# Patient Record
Sex: FEMALE | Race: WHITE | Employment: OTHER | URBAN - METROPOLITAN AREA
[De-identification: names, ages, dates, MRNs, and addresses within clinical notes are randomized per-mention and may not be internally consistent; named-entity substitution may affect disease eponyms.]

---

## 2017-01-03 ENCOUNTER — HOSPITAL ENCOUNTER (OUTPATIENT)
Dept: CARDIAC REHAB | Age: 82
Discharge: HOME | End: 2017-01-03
Payer: MEDICARE

## 2017-01-03 VITALS — BODY MASS INDEX: 24.52 KG/M2 | HEIGHT: 68 IN | WEIGHT: 161.8 LBS

## 2017-01-03 PROCEDURE — 93798 PHYS/QHP OP CAR RHAB W/ECG: CPT

## 2017-01-04 ENCOUNTER — APPOINTMENT (OUTPATIENT)
Dept: CARDIAC REHAB | Age: 82
End: 2017-01-04
Payer: MEDICARE

## 2017-01-09 ENCOUNTER — HOSPITAL ENCOUNTER (OUTPATIENT)
Dept: CARDIAC REHAB | Age: 82
Discharge: HOME | End: 2017-01-09
Payer: MEDICARE

## 2017-01-09 PROCEDURE — 93798 PHYS/QHP OP CAR RHAB W/ECG: CPT

## 2017-01-11 ENCOUNTER — APPOINTMENT (OUTPATIENT)
Dept: CARDIAC REHAB | Age: 82
End: 2017-01-11
Payer: MEDICARE

## 2017-01-16 ENCOUNTER — APPOINTMENT (OUTPATIENT)
Dept: CARDIAC REHAB | Age: 82
End: 2017-01-16
Payer: MEDICARE

## 2017-01-18 ENCOUNTER — HOSPITAL ENCOUNTER (OUTPATIENT)
Dept: CARDIAC REHAB | Age: 82
Discharge: HOME | End: 2017-01-18
Payer: MEDICARE

## 2017-01-18 VITALS — WEIGHT: 157.8 LBS | BODY MASS INDEX: 23.99 KG/M2

## 2017-01-18 PROCEDURE — 93798 PHYS/QHP OP CAR RHAB W/ECG: CPT

## 2017-01-23 ENCOUNTER — APPOINTMENT (OUTPATIENT)
Dept: CARDIAC REHAB | Age: 82
End: 2017-01-23
Payer: MEDICARE

## 2017-01-25 ENCOUNTER — HOSPITAL ENCOUNTER (OUTPATIENT)
Dept: CARDIAC REHAB | Age: 82
Discharge: HOME | End: 2017-01-25
Payer: MEDICARE

## 2017-01-25 VITALS — BODY MASS INDEX: 24.08 KG/M2 | WEIGHT: 158.4 LBS

## 2017-01-25 PROCEDURE — 93798 PHYS/QHP OP CAR RHAB W/ECG: CPT

## 2017-01-27 ENCOUNTER — HOSPITAL ENCOUNTER (OUTPATIENT)
Dept: CARDIAC REHAB | Age: 82
Discharge: HOME | End: 2017-01-27
Payer: MEDICARE

## 2017-01-27 PROCEDURE — 93798 PHYS/QHP OP CAR RHAB W/ECG: CPT

## 2017-01-30 ENCOUNTER — APPOINTMENT (OUTPATIENT)
Dept: CARDIAC REHAB | Age: 82
End: 2017-01-30
Payer: MEDICARE

## 2017-02-01 ENCOUNTER — APPOINTMENT (OUTPATIENT)
Dept: CARDIAC REHAB | Age: 82
End: 2017-02-01
Payer: MEDICARE

## 2017-02-06 ENCOUNTER — HOSPITAL ENCOUNTER (OUTPATIENT)
Dept: CARDIAC REHAB | Age: 82
Discharge: HOME | End: 2017-02-06
Payer: MEDICARE

## 2017-02-06 PROCEDURE — 93798 PHYS/QHP OP CAR RHAB W/ECG: CPT

## 2017-02-08 ENCOUNTER — HOSPITAL ENCOUNTER (OUTPATIENT)
Dept: CARDIAC REHAB | Age: 82
Discharge: HOME | End: 2017-02-08
Payer: MEDICARE

## 2017-02-08 VITALS — BODY MASS INDEX: 23.93 KG/M2 | WEIGHT: 157.4 LBS

## 2017-02-08 PROCEDURE — 93798 PHYS/QHP OP CAR RHAB W/ECG: CPT

## 2017-02-13 ENCOUNTER — HOSPITAL ENCOUNTER (OUTPATIENT)
Dept: CARDIAC REHAB | Age: 82
Discharge: HOME | End: 2017-02-13
Payer: MEDICARE

## 2017-02-13 PROCEDURE — 93798 PHYS/QHP OP CAR RHAB W/ECG: CPT

## 2017-02-15 ENCOUNTER — HOSPITAL ENCOUNTER (OUTPATIENT)
Dept: CARDIAC REHAB | Age: 82
Discharge: HOME | End: 2017-02-15
Payer: MEDICARE

## 2017-02-15 VITALS — WEIGHT: 158 LBS | BODY MASS INDEX: 24.02 KG/M2

## 2017-02-15 PROCEDURE — 93798 PHYS/QHP OP CAR RHAB W/ECG: CPT

## 2017-02-22 ENCOUNTER — HOSPITAL ENCOUNTER (OUTPATIENT)
Dept: CARDIAC REHAB | Age: 82
Discharge: HOME | End: 2017-02-22
Payer: MEDICARE

## 2017-02-22 VITALS — WEIGHT: 158.4 LBS | BODY MASS INDEX: 24.08 KG/M2

## 2017-02-22 PROCEDURE — 93798 PHYS/QHP OP CAR RHAB W/ECG: CPT

## 2017-02-24 ENCOUNTER — HOSPITAL ENCOUNTER (OUTPATIENT)
Dept: CARDIAC REHAB | Age: 82
Discharge: HOME | End: 2017-02-24
Payer: MEDICARE

## 2017-02-24 PROCEDURE — 93798 PHYS/QHP OP CAR RHAB W/ECG: CPT

## 2017-02-27 ENCOUNTER — APPOINTMENT (OUTPATIENT)
Dept: CARDIAC REHAB | Age: 82
End: 2017-02-27
Payer: MEDICARE

## 2017-03-01 ENCOUNTER — HOSPITAL ENCOUNTER (OUTPATIENT)
Dept: CARDIAC REHAB | Age: 82
End: 2017-03-01
Payer: MEDICARE

## 2017-03-03 ENCOUNTER — HOSPITAL ENCOUNTER (OUTPATIENT)
Dept: CARDIAC REHAB | Age: 82
Discharge: HOME | End: 2017-03-03
Payer: MEDICARE

## 2017-03-03 VITALS — WEIGHT: 158.8 LBS | BODY MASS INDEX: 24.15 KG/M2

## 2017-03-03 PROCEDURE — 93798 PHYS/QHP OP CAR RHAB W/ECG: CPT

## 2017-03-06 ENCOUNTER — APPOINTMENT (OUTPATIENT)
Dept: CARDIAC REHAB | Age: 82
End: 2017-03-06
Payer: MEDICARE

## 2017-03-06 ENCOUNTER — HOSPITAL ENCOUNTER (OUTPATIENT)
Dept: LAB | Age: 82
Discharge: HOME | End: 2017-03-06
Payer: MEDICARE

## 2017-03-06 DIAGNOSIS — R35.0 URINE FREQUENCY: ICD-10-CM

## 2017-03-06 LAB
APPEARANCE UR: CLEAR
BACTERIA URNS QL MICRO: ABNORMAL /HPF
BILIRUB UR QL: NEGATIVE
CASTS URNS QL MICRO: ABNORMAL /LPF
COLOR UR: YELLOW
CRYSTALS URNS QL MICRO: 0 /LPF
EPI CELLS #/AREA URNS HPF: ABNORMAL /HPF
GLUCOSE UR STRIP.AUTO-MCNC: NEGATIVE MG/DL
HGB UR QL STRIP: NEGATIVE
KETONES UR QL STRIP.AUTO: ABNORMAL MG/DL
LEUKOCYTE ESTERASE UR QL STRIP.AUTO: NEGATIVE
MUCOUS THREADS URNS QL MICRO: ABNORMAL /LPF
NITRITE UR QL STRIP.AUTO: NEGATIVE
PH UR STRIP: 5.5 [PH] (ref 5–9)
PROT UR STRIP-MCNC: 30 MG/DL
RBC #/AREA URNS HPF: ABNORMAL /HPF
SP GR UR REFRACTOMETRY: 1.02 (ref 1–1.02)
UROBILINOGEN UR QL STRIP.AUTO: 0.2 EU/DL (ref 0.2–1)
WBC URNS QL MICRO: ABNORMAL /HPF

## 2017-03-06 PROCEDURE — 81015 MICROSCOPIC EXAM OF URINE: CPT | Performed by: INTERNAL MEDICINE

## 2017-03-06 PROCEDURE — 81003 URINALYSIS AUTO W/O SCOPE: CPT | Performed by: INTERNAL MEDICINE

## 2017-03-08 ENCOUNTER — HOSPITAL ENCOUNTER (OUTPATIENT)
Dept: CARDIAC REHAB | Age: 82
Discharge: HOME | End: 2017-03-08
Payer: MEDICARE

## 2017-03-08 VITALS — WEIGHT: 157.4 LBS | BODY MASS INDEX: 23.93 KG/M2

## 2017-03-08 PROCEDURE — 93798 PHYS/QHP OP CAR RHAB W/ECG: CPT

## 2017-03-10 ENCOUNTER — HOSPITAL ENCOUNTER (OUTPATIENT)
Dept: CARDIAC REHAB | Age: 82
Discharge: HOME | End: 2017-03-10
Payer: MEDICARE

## 2017-03-10 PROCEDURE — 93798 PHYS/QHP OP CAR RHAB W/ECG: CPT

## 2017-03-13 ENCOUNTER — HOSPITAL ENCOUNTER (OUTPATIENT)
Dept: CARDIAC REHAB | Age: 82
End: 2017-03-13
Payer: MEDICARE

## 2017-03-13 ENCOUNTER — HOSPITAL ENCOUNTER (OUTPATIENT)
Dept: LAB | Age: 82
Discharge: HOME | End: 2017-03-13
Payer: MEDICARE

## 2017-03-13 DIAGNOSIS — I44.7 LEFT BUNDLE BRANCH BLOCK: ICD-10-CM

## 2017-03-13 DIAGNOSIS — I50.22 CHRONIC SYSTOLIC CHF (CONGESTIVE HEART FAILURE) (HCC): ICD-10-CM

## 2017-03-13 DIAGNOSIS — I49.3 PVC'S (PREMATURE VENTRICULAR CONTRACTIONS): ICD-10-CM

## 2017-03-13 LAB
ALBUMIN SERPL BCP-MCNC: 3.6 G/DL (ref 3.2–4.6)
ALBUMIN/GLOB SERPL: 1 {RATIO}
ALP SERPL-CCNC: 56 U/L (ref 50–136)
ALT SERPL-CCNC: 19 U/L (ref 12–65)
ANION GAP BLD CALC-SCNC: 8 MMOL/L
AST SERPL W P-5'-P-CCNC: 13 U/L (ref 15–37)
BASOPHILS # BLD AUTO: 0 K/UL (ref 0–0.2)
BASOPHILS # BLD: 0 % (ref 0–2)
BILIRUB SERPL-MCNC: 0.4 MG/DL (ref 0.2–1.1)
BUN SERPL-MCNC: 14 MG/DL (ref 8–23)
CALCIUM SERPL-MCNC: 9.5 MG/DL (ref 8.3–10.4)
CHLORIDE SERPL-SCNC: 106 MMOL/L (ref 98–107)
CO2 SERPL-SCNC: 26 MMOL/L (ref 23–32)
CREAT SERPL-MCNC: 1.2 MG/DL (ref 0.6–1)
DIFFERENTIAL METHOD BLD: ABNORMAL
EOSINOPHIL # BLD: 0.2 K/UL (ref 0–0.8)
EOSINOPHIL NFR BLD: 2 % (ref 0.5–7.8)
ERYTHROCYTE [DISTWIDTH] IN BLOOD BY AUTOMATED COUNT: 14.1 % (ref 11.9–14.6)
GLOBULIN SER CALC-MCNC: 3.7 G/DL
GLUCOSE SERPL-MCNC: 113 MG/DL (ref 65–100)
HCT VFR BLD AUTO: 43.4 % (ref 35.8–46.3)
HGB BLD-MCNC: 14 G/DL (ref 11.7–15.4)
LYMPHOCYTES # BLD AUTO: 17 % (ref 13–44)
LYMPHOCYTES # BLD: 1.7 K/UL (ref 0.5–4.6)
MAGNESIUM SERPL-MCNC: 2.2 MG/DL (ref 1.8–2.4)
MCH RBC QN AUTO: 30.4 PG (ref 26.1–32.9)
MCHC RBC AUTO-ENTMCNC: 32.3 G/DL (ref 31.4–35)
MCV RBC AUTO: 94.1 FL (ref 79.6–97.8)
MONOCYTES # BLD: 1 K/UL (ref 0.1–1.3)
MONOCYTES NFR BLD AUTO: 10 % (ref 4–12)
NEUTS SEG # BLD: 7.1 K/UL (ref 1.7–8.2)
NEUTS SEG NFR BLD AUTO: 71 % (ref 43–78)
PLATELET # BLD AUTO: 246 K/UL (ref 150–450)
PMV BLD AUTO: 9.8 FL (ref 10.8–14.1)
POTASSIUM SERPL-SCNC: 5.2 MMOL/L (ref 3.5–5.1)
PROT SERPL-MCNC: 7.3 G/DL (ref 6.3–8.2)
RBC # BLD AUTO: 4.61 M/UL (ref 4.05–5.25)
SODIUM SERPL-SCNC: 140 MMOL/L (ref 136–145)
WBC # BLD AUTO: 10.1 K/UL (ref 4.3–11.1)

## 2017-03-13 PROCEDURE — 85025 COMPLETE CBC W/AUTO DIFF WBC: CPT | Performed by: INTERNAL MEDICINE

## 2017-03-13 PROCEDURE — 36415 COLL VENOUS BLD VENIPUNCTURE: CPT | Performed by: INTERNAL MEDICINE

## 2017-03-13 PROCEDURE — 80053 COMPREHEN METABOLIC PANEL: CPT | Performed by: INTERNAL MEDICINE

## 2017-03-13 PROCEDURE — 83735 ASSAY OF MAGNESIUM: CPT | Performed by: INTERNAL MEDICINE

## 2017-03-15 ENCOUNTER — APPOINTMENT (OUTPATIENT)
Dept: CARDIAC REHAB | Age: 82
End: 2017-03-15
Payer: MEDICARE

## 2017-03-16 ENCOUNTER — ANESTHESIA EVENT (OUTPATIENT)
Dept: SURGERY | Age: 82
End: 2017-03-16
Payer: MEDICARE

## 2017-03-16 NOTE — PROGRESS NOTES
Patient pre-assessment complete for BiV ICD with DR Mary Allison scheduled for 3/17/17 at 1:00, arrival time 11am. Patient verified using . Patient instructed to bring all home medications in labeled bottles on the day of procedure. NPO status reinforced. Patient instructed to HOLD losartan & spironolactone. Instructed they can take all other medications excluding vitamins & supplements. Patient verbalizes understanding of all instructions & denies any questions at this time.

## 2017-03-17 ENCOUNTER — ANESTHESIA (OUTPATIENT)
Dept: SURGERY | Age: 82
End: 2017-03-17
Payer: MEDICARE

## 2017-03-17 ENCOUNTER — HOSPITAL ENCOUNTER (OUTPATIENT)
Age: 82
Setting detail: OBSERVATION
Discharge: HOME | End: 2017-03-18
Attending: INTERNAL MEDICINE | Admitting: INTERNAL MEDICINE
Payer: MEDICARE

## 2017-03-17 ENCOUNTER — APPOINTMENT (OUTPATIENT)
Dept: CARDIAC CATH/INVASIVE PROCEDURES | Age: 82
End: 2017-03-17
Payer: MEDICARE

## 2017-03-17 DIAGNOSIS — I50.22 CHRONIC SYSTOLIC CHF (CONGESTIVE HEART FAILURE) (HCC): Primary | ICD-10-CM

## 2017-03-17 PROBLEM — Z95.810 BIVENTRICULAR ICD (IMPLANTABLE CARDIOVERTER-DEFIBRILLATOR) IN PLACE: Status: ACTIVE | Noted: 2017-03-17

## 2017-03-17 LAB
ANION GAP BLD CALC-SCNC: 13 MMOL/L (ref 7–16)
ATRIAL RATE: 72 BPM
ATRIAL RATE: 81 BPM
BUN SERPL-MCNC: 14 MG/DL (ref 8–23)
CALCIUM SERPL-MCNC: 9.6 MG/DL (ref 8.3–10.4)
CALCULATED P AXIS, ECG09: 27 DEGREES
CALCULATED P AXIS, ECG09: 69 DEGREES
CALCULATED R AXIS, ECG10: -3 DEGREES
CALCULATED R AXIS, ECG10: -83 DEGREES
CALCULATED T AXIS, ECG11: 157 DEGREES
CALCULATED T AXIS, ECG11: 51 DEGREES
CHLORIDE SERPL-SCNC: 110 MMOL/L (ref 98–107)
CO2 SERPL-SCNC: 21 MMOL/L (ref 21–32)
CREAT SERPL-MCNC: 1.25 MG/DL (ref 0.6–1)
DIAGNOSIS, 93000: NORMAL
DIAGNOSIS, 93000: NORMAL
DIASTOLIC BP, ECG02: NORMAL MMHG
DIASTOLIC BP, ECG02: NORMAL MMHG
ERYTHROCYTE [DISTWIDTH] IN BLOOD BY AUTOMATED COUNT: 14.2 % (ref 11.9–14.6)
GLUCOSE SERPL-MCNC: 116 MG/DL (ref 65–100)
HCT VFR BLD AUTO: 43.8 % (ref 35.8–46.3)
HGB BLD-MCNC: 14.8 G/DL (ref 11.7–15.4)
INR PPP: 1 (ref 0.9–1.2)
MAGNESIUM SERPL-MCNC: 2 MG/DL (ref 1.8–2.4)
MCH RBC QN AUTO: 31.4 PG (ref 26.1–32.9)
MCHC RBC AUTO-ENTMCNC: 33.8 G/DL (ref 31.4–35)
MCV RBC AUTO: 92.8 FL (ref 79.6–97.8)
P-R INTERVAL, ECG05: 110 MS
P-R INTERVAL, ECG05: 168 MS
PLATELET # BLD AUTO: 270 K/UL (ref 150–450)
PMV BLD AUTO: 11.1 FL (ref 10.8–14.1)
POTASSIUM SERPL-SCNC: 4.6 MMOL/L (ref 3.5–5.1)
PROTHROMBIN TIME: 10.4 SEC (ref 9.6–12)
Q-T INTERVAL, ECG07: 454 MS
Q-T INTERVAL, ECG07: 462 MS
QRS DURATION, ECG06: 128 MS
QRS DURATION, ECG06: 140 MS
QTC CALCULATION (BEZET), ECG08: 497 MS
QTC CALCULATION (BEZET), ECG08: 536 MS
RBC # BLD AUTO: 4.72 M/UL (ref 4.05–5.25)
SODIUM SERPL-SCNC: 144 MMOL/L (ref 136–145)
SYSTOLIC BP, ECG01: NORMAL MMHG
SYSTOLIC BP, ECG01: NORMAL MMHG
VENTRICULAR RATE, ECG03: 72 BPM
VENTRICULAR RATE, ECG03: 81 BPM
WBC # BLD AUTO: 11.8 K/UL (ref 4.3–11.1)

## 2017-03-17 PROCEDURE — 74011250636 HC RX REV CODE- 250/636

## 2017-03-17 PROCEDURE — 93005 ELECTROCARDIOGRAM TRACING: CPT | Performed by: INTERNAL MEDICINE

## 2017-03-17 PROCEDURE — C1751 CATH, INF, PER/CENT/MIDLINE: HCPCS

## 2017-03-17 PROCEDURE — C1892 INTRO/SHEATH,FIXED,PEEL-AWAY: HCPCS

## 2017-03-17 PROCEDURE — 74011250636 HC RX REV CODE- 250/636: Performed by: INTERNAL MEDICINE

## 2017-03-17 PROCEDURE — 74011250637 HC RX REV CODE- 250/637: Performed by: INTERNAL MEDICINE

## 2017-03-17 PROCEDURE — C1898 LEAD, PMKR, OTHER THAN TRANS: HCPCS

## 2017-03-17 PROCEDURE — 85610 PROTHROMBIN TIME: CPT | Performed by: INTERNAL MEDICINE

## 2017-03-17 PROCEDURE — 77030031139 HC SUT VCRL2 J&J -A

## 2017-03-17 PROCEDURE — C1882 AICD, OTHER THAN SING/DUAL: HCPCS

## 2017-03-17 PROCEDURE — 33249 INSJ/RPLCMT DEFIB W/LEAD(S): CPT

## 2017-03-17 PROCEDURE — 74011636320 HC RX REV CODE- 636/320: Performed by: INTERNAL MEDICINE

## 2017-03-17 PROCEDURE — 83735 ASSAY OF MAGNESIUM: CPT | Performed by: INTERNAL MEDICINE

## 2017-03-17 PROCEDURE — 77030008467 HC STPLR SKN COVD -B

## 2017-03-17 PROCEDURE — 76060000034 HC ANESTHESIA 1.5 TO 2 HR: Performed by: INTERNAL MEDICINE

## 2017-03-17 PROCEDURE — C1769 GUIDE WIRE: HCPCS

## 2017-03-17 PROCEDURE — 33225 L VENTRIC PACING LEAD ADD-ON: CPT

## 2017-03-17 PROCEDURE — C1896 LEAD, AICD, NON SING/DUAL: HCPCS

## 2017-03-17 PROCEDURE — 74011000250 HC RX REV CODE- 250: Performed by: INTERNAL MEDICINE

## 2017-03-17 PROCEDURE — L3670 SO ACRO/CLAV CAN WEB PRE OTS: HCPCS

## 2017-03-17 PROCEDURE — C1900 LEAD, CORONARY VENOUS: HCPCS

## 2017-03-17 PROCEDURE — 74011000272 HC RX REV CODE- 272: Performed by: INTERNAL MEDICINE

## 2017-03-17 PROCEDURE — 99218 HC RM OBSERVATION: CPT

## 2017-03-17 PROCEDURE — 77030018579 HC SUT TICRN1 COVD -B

## 2017-03-17 PROCEDURE — 80048 BASIC METABOLIC PNL TOTAL CA: CPT | Performed by: INTERNAL MEDICINE

## 2017-03-17 PROCEDURE — 93641 EP EVL 1/2CHMB PAC CVDFB TST: CPT

## 2017-03-17 PROCEDURE — 77030012935 HC DRSG AQUACEL BMS -B

## 2017-03-17 PROCEDURE — 85027 COMPLETE CBC AUTOMATED: CPT | Performed by: INTERNAL MEDICINE

## 2017-03-17 RX ORDER — NALOXONE HYDROCHLORIDE 0.4 MG/ML
0.1 INJECTION, SOLUTION INTRAMUSCULAR; INTRAVENOUS; SUBCUTANEOUS
Status: DISCONTINUED | OUTPATIENT
Start: 2017-03-17 | End: 2017-03-18 | Stop reason: HOSPADM

## 2017-03-17 RX ORDER — ZOLPIDEM TARTRATE 5 MG/1
5 TABLET ORAL
Status: DISCONTINUED | OUTPATIENT
Start: 2017-03-17 | End: 2017-03-18 | Stop reason: HOSPADM

## 2017-03-17 RX ORDER — AMIODARONE HYDROCHLORIDE 200 MG/1
200 TABLET ORAL 2 TIMES DAILY
Status: DISCONTINUED | OUTPATIENT
Start: 2017-03-17 | End: 2017-03-18 | Stop reason: HOSPADM

## 2017-03-17 RX ORDER — SODIUM CHLORIDE, SODIUM LACTATE, POTASSIUM CHLORIDE, CALCIUM CHLORIDE 600; 310; 30; 20 MG/100ML; MG/100ML; MG/100ML; MG/100ML
100 INJECTION, SOLUTION INTRAVENOUS CONTINUOUS
Status: DISCONTINUED | OUTPATIENT
Start: 2017-03-17 | End: 2017-03-18 | Stop reason: HOSPADM

## 2017-03-17 RX ORDER — SODIUM CHLORIDE 0.9 % (FLUSH) 0.9 %
5-10 SYRINGE (ML) INJECTION EVERY 8 HOURS
Status: DISCONTINUED | OUTPATIENT
Start: 2017-03-17 | End: 2017-03-18 | Stop reason: HOSPADM

## 2017-03-17 RX ORDER — PROPOFOL 10 MG/ML
INJECTION, EMULSION INTRAVENOUS AS NEEDED
Status: DISCONTINUED | OUTPATIENT
Start: 2017-03-17 | End: 2017-03-17 | Stop reason: HOSPADM

## 2017-03-17 RX ORDER — PROPOFOL 10 MG/ML
INJECTION, EMULSION INTRAVENOUS
Status: DISCONTINUED | OUTPATIENT
Start: 2017-03-17 | End: 2017-03-17 | Stop reason: HOSPADM

## 2017-03-17 RX ORDER — ONDANSETRON 2 MG/ML
4 INJECTION INTRAMUSCULAR; INTRAVENOUS
Status: DISCONTINUED | OUTPATIENT
Start: 2017-03-17 | End: 2017-03-18 | Stop reason: HOSPADM

## 2017-03-17 RX ORDER — ALPRAZOLAM 0.25 MG/1
0.25 TABLET ORAL
Status: DISCONTINUED | OUTPATIENT
Start: 2017-03-17 | End: 2017-03-18 | Stop reason: HOSPADM

## 2017-03-17 RX ORDER — SODIUM CHLORIDE 0.9 % (FLUSH) 0.9 %
5-10 SYRINGE (ML) INJECTION AS NEEDED
Status: DISCONTINUED | OUTPATIENT
Start: 2017-03-17 | End: 2017-03-18 | Stop reason: HOSPADM

## 2017-03-17 RX ORDER — GUAIFENESIN 100 MG/5ML
81 LIQUID (ML) ORAL DAILY
Status: DISCONTINUED | OUTPATIENT
Start: 2017-03-18 | End: 2017-03-18 | Stop reason: HOSPADM

## 2017-03-17 RX ORDER — CEFAZOLIN SODIUM IN 0.9 % NACL 2 G/50 ML
2 INTRAVENOUS SOLUTION, PIGGYBACK (ML) INTRAVENOUS EVERY 8 HOURS
Status: DISCONTINUED | OUTPATIENT
Start: 2017-03-17 | End: 2017-03-17 | Stop reason: SDUPTHER

## 2017-03-17 RX ORDER — DULOXETIN HYDROCHLORIDE 60 MG/1
60 CAPSULE, DELAYED RELEASE ORAL DAILY
Status: DISCONTINUED | OUTPATIENT
Start: 2017-03-18 | End: 2017-03-18 | Stop reason: HOSPADM

## 2017-03-17 RX ORDER — LIDOCAINE HYDROCHLORIDE 10 MG/ML
0.1 INJECTION INFILTRATION; PERINEURAL AS NEEDED
Status: DISCONTINUED | OUTPATIENT
Start: 2017-03-17 | End: 2017-03-18 | Stop reason: HOSPADM

## 2017-03-17 RX ORDER — BUPIVACAINE HYDROCHLORIDE AND EPINEPHRINE 5; 5 MG/ML; UG/ML
60 INJECTION, SOLUTION EPIDURAL; INTRACAUDAL; PERINEURAL ONCE
Status: COMPLETED | OUTPATIENT
Start: 2017-03-17 | End: 2017-03-17

## 2017-03-17 RX ORDER — LOSARTAN POTASSIUM 25 MG/1
25 TABLET ORAL DAILY
Status: DISCONTINUED | OUTPATIENT
Start: 2017-03-18 | End: 2017-03-18 | Stop reason: HOSPADM

## 2017-03-17 RX ORDER — SODIUM CHLORIDE, SODIUM LACTATE, POTASSIUM CHLORIDE, CALCIUM CHLORIDE 600; 310; 30; 20 MG/100ML; MG/100ML; MG/100ML; MG/100ML
75 INJECTION, SOLUTION INTRAVENOUS CONTINUOUS
Status: DISCONTINUED | OUTPATIENT
Start: 2017-03-17 | End: 2017-03-17

## 2017-03-17 RX ORDER — ACETAMINOPHEN 325 MG/1
650 TABLET ORAL
Status: DISCONTINUED | OUTPATIENT
Start: 2017-03-17 | End: 2017-03-18 | Stop reason: HOSPADM

## 2017-03-17 RX ORDER — HYDROMORPHONE HYDROCHLORIDE 1 MG/ML
0.5 INJECTION, SOLUTION INTRAMUSCULAR; INTRAVENOUS; SUBCUTANEOUS
Status: DISCONTINUED | OUTPATIENT
Start: 2017-03-17 | End: 2017-03-18 | Stop reason: HOSPADM

## 2017-03-17 RX ORDER — AMLODIPINE BESYLATE 10 MG/1
10 TABLET ORAL
Status: DISCONTINUED | OUTPATIENT
Start: 2017-03-18 | End: 2017-03-18 | Stop reason: HOSPADM

## 2017-03-17 RX ORDER — SPIRONOLACTONE 25 MG/1
25 TABLET ORAL DAILY
Status: DISCONTINUED | OUTPATIENT
Start: 2017-03-18 | End: 2017-03-18 | Stop reason: HOSPADM

## 2017-03-17 RX ORDER — FLUMAZENIL 0.1 MG/ML
0.2 INJECTION INTRAVENOUS
Status: DISCONTINUED | OUTPATIENT
Start: 2017-03-17 | End: 2017-03-18 | Stop reason: HOSPADM

## 2017-03-17 RX ORDER — CEFAZOLIN SODIUM IN 0.9 % NACL 2 G/50 ML
2 INTRAVENOUS SOLUTION, PIGGYBACK (ML) INTRAVENOUS EVERY 8 HOURS
Status: COMPLETED | OUTPATIENT
Start: 2017-03-17 | End: 2017-03-18

## 2017-03-17 RX ORDER — CARVEDILOL 25 MG/1
25 TABLET ORAL 2 TIMES DAILY WITH MEALS
Status: DISCONTINUED | OUTPATIENT
Start: 2017-03-17 | End: 2017-03-18 | Stop reason: HOSPADM

## 2017-03-17 RX ORDER — AMIODARONE HYDROCHLORIDE 150 MG/3ML
INJECTION, SOLUTION INTRAVENOUS AS NEEDED
Status: DISCONTINUED | OUTPATIENT
Start: 2017-03-17 | End: 2017-03-17 | Stop reason: HOSPADM

## 2017-03-17 RX ADMIN — ZOLPIDEM TARTRATE 5 MG: 5 TABLET ORAL at 22:58

## 2017-03-17 RX ADMIN — ACETAMINOPHEN 650 MG: 325 TABLET ORAL at 16:20

## 2017-03-17 RX ADMIN — NEOMYCIN AND POLYMYXIN B SULFATES: 40; 200000 IRRIGANT IRRIGATION at 12:00

## 2017-03-17 RX ADMIN — PROPOFOL 50 MCG/KG/MIN: 10 INJECTION, EMULSION INTRAVENOUS at 13:32

## 2017-03-17 RX ADMIN — IOVERSOL 5 ML: 678 INJECTION INTRA-ARTERIAL; INTRAVENOUS at 12:00

## 2017-03-17 RX ADMIN — BUPIVACAINE HYDROCHLORIDE AND EPINEPHRINE 300 MG: 5; 5 INJECTION, SOLUTION EPIDURAL; INTRACAUDAL; PERINEURAL at 12:00

## 2017-03-17 RX ADMIN — AMIODARONE HYDROCHLORIDE 200 MG: 200 TABLET ORAL at 17:38

## 2017-03-17 RX ADMIN — TICAGRELOR 90 MG: 90 TABLET ORAL at 17:39

## 2017-03-17 RX ADMIN — ACETAMINOPHEN 650 MG: 325 TABLET ORAL at 20:28

## 2017-03-17 RX ADMIN — Medication 10 ML: at 22:58

## 2017-03-17 RX ADMIN — Medication 10 ML: at 17:42

## 2017-03-17 RX ADMIN — SODIUM CHLORIDE, SODIUM LACTATE, POTASSIUM CHLORIDE, AND CALCIUM CHLORIDE: 600; 310; 30; 20 INJECTION, SOLUTION INTRAVENOUS at 13:04

## 2017-03-17 RX ADMIN — CARVEDILOL 25 MG: 25 TABLET, FILM COATED ORAL at 17:38

## 2017-03-17 RX ADMIN — PROPOFOL 50 MG: 10 INJECTION, EMULSION INTRAVENOUS at 13:32

## 2017-03-17 RX ADMIN — CEFAZOLIN 2 G: 1 INJECTION, POWDER, FOR SOLUTION INTRAMUSCULAR; INTRAVENOUS; PARENTERAL at 19:01

## 2017-03-17 RX ADMIN — AMIODARONE HYDROCHLORIDE 150 MG: 150 INJECTION, SOLUTION INTRAVENOUS at 14:53

## 2017-03-17 RX ADMIN — PROPOFOL 20 MG: 10 INJECTION, EMULSION INTRAVENOUS at 13:45

## 2017-03-17 NOTE — PROGRESS NOTES
Report given to Waldemar Swan, RN  Ancef infusing  L subclavian incision without bleeding/hematoma.  Pea size sanguinous drainage to aquacel assessed together

## 2017-03-17 NOTE — PROGRESS NOTES
This note will not be viewable in [facility]    Patient here for BIV ICD Implant. She was previously consented for the QP ExCELs study. The study's design, purpose, risks/benefits, alternatives and costs were reviewed again with the patient and she was reminded that study participation is completely voluntary. Pt. States she wants to continue with study participation. She denies any questions/concerns at this time. The patient denies any AE/SAEs, hospitalizations, medical procedures or medication changes since the last study contact.

## 2017-03-17 NOTE — ANESTHESIA POSTPROCEDURE EVALUATION
Post-Anesthesia Evaluation and Assessment    Patient: Villa Clemons MRN: [id number]  SSN: [id number]    YOB: 1935  Age: 80 y.o. Sex: female       Cardiovascular Function/Vital Signs  Visit Vitals    /72    Pulse 77    Temp 36.8 °C (98.2 °F)    Resp (!) 6    Ht 5' 8\" (1.727 m)    Wt 71.7 kg (158 lb)    SpO2 97%    Breastfeeding No    BMI 24.02 kg/m2       Patient is status post total IV anesthesia anesthesia for Procedure(s):  BIV ICD. Nausea/Vomiting: None    Postoperative hydration reviewed and adequate. Pain:  Pain Scale 1: Numeric (0 - 10) (03/17/17 1531)  Pain Intensity 1: 0 (03/17/17 1531)   Managed    Neurological Status:   Neuro (WDL): Within Defined Limits (03/17/17 1531)   At baseline    Mental Status and Level of Consciousness: Arousable    Pulmonary Status:   O2 Device: Nasal cannula (03/17/17 1509)   Adequate oxygenation and airway patent    Complications related to anesthesia: None    Post-anesthesia assessment completed.  No concerns    Signed By: Bismark Curtis MD     March 17, 2017

## 2017-03-17 NOTE — PROCEDURES
PRE-ELECTROPHYSIOLOGY STUDY DIAGNOSES  1. Chronic systolic heart failure, ejection fraction of 30%  2. Ischemic dilated cardiomyopathy. 3. Class II heart failure symptoms. 4. Chronic systolic heart failure for greater than 1 year's duration. 5. Primary prevention indications for defibrillator  6. No hospitalizations for congestive heart failure. 7. Life expectancy greater than 1 year. 8. Left bundle branch block with QRS duration >130 milliseconds. 9. On Guideline directed medical therapy maximum dose for >3 months prior to implant. 10. Patient has indications for permanent pacing secondary to resynchronized biventricular therapy   11. Patient has bradycardia pacing indications secondary to pharmacological rates control. 12. Frequent PVCs      PROCEDURE PERFORMED  1. Insertion of Biotronik biventricular implantable cardioverter defibrillator. 2. Testing of implantable cardioverter defibrillator. 3. Insertion of left ventricular lead at time of new system Implantation. Anesthesia: MAC     Estimated Blood Loss: Less than 10 mL     Specimens: * No specimens in log *     PROCEDURE IN DETAIL: The patient was brought into the EP lab in a fasting  state. The left shoulder was prepped and draped in the usual sterile  fashion. Skin anesthetized with lidocaine with epinephrine. Incision was  made in the region of the deltopectoral groove. Pocket was made for the  biventricular ICD. Access was obtained in the left subclavian vein via  the Seldinger technique x3 over which 3 separate guidewires were placed. Over the first guidewire, an 8-Tristanian sheath was placed. Through this  8-Tristanian sheath, a Biotronik ICD lead, model Plexa ProMRI SD 65/18 was placed  at the RV apex, we achieved the following values: R waves were 13.3mV,  threshold was 0.6 V at 0.4 msec pulse width. This lead was then secured  to the pectoral fascia with 0 Ti-Cron x2.  Over the next guidewire, a  9-Tristanian sheath was placed through which a coronary sinus mapping  catheter and sheath was placed in the coronary sinus. Venogram was  performed that showed a suitable posterolateral branch through which a  Biotronik LV lead, model Sentus OTW QP, 75 cm was placed, we achieved  the following values: Threshold was 1.3 V at 0.4 msec pulse width. This  lead was then secured to the pectoral fascia with 0 Ti-Cron x2. Over the  third guidewire, a 7-Serbian sheath was placed through which a Biotronik  right atrial lead, model  Solia, 46 cm, was placed in the  right atrial appendage. We achieved the following values: P waves were  2.9mV, threshold was 0.8 V at 0.4 msec pulse width. This lead was then  secured to the pectoral fascia with 0 Ti-Cron x2. Pocket was irrigated  with triple antibiotic flush. The leads were connected to a Biotronik  biventricular ICD, model Iperia 7 HF-T QP, serial [id number]. The leads  and biventricular ICD were then placed in the pocket area. Defibrillator  threshold testing was performed and the patient was placed into  ventricular fibrillation, was converted out with 25 joules of energy. Pocket was then closed with 2-0 Vicryl, followed by a next layer of 3-0  Vicryl, followed by a superficial layer of staples. The wound was  dressed. The patient was recovered from his sedation, transferred from  the lab in a stable condition. IMPRESSION: Successful implantation of Biotronik biventricular  implantable cardioverter defibrillator for primary prevention of sudden death.

## 2017-03-17 NOTE — PROGRESS NOTES
Patient received to [address] [place] [address] room # 4  Ambulatory from [facility]. Patient scheduled for Biv/ICD today with Dr Margarette Alvarado. Procedure reviewed & questions answered, voiced good understanding consent obtained & placed on chart. All medications and medical history reviewed. Will prep patient per orders. Patient & family updated on plan of care.

## 2017-03-17 NOTE — IP AVS SNAPSHOT
Current Discharge Medication List  
  
START taking these medications Dose & Instructions Dispensing Information Comments Morning Noon Evening Bedtime  
 amiodarone 200 mg tablet Commonly known as:  CORDARONE Your last dose was: Your next dose is:    
   
   
 Dose:  200 mg Take 1 Tab by mouth two (2) times a day. Quantity:  60 Tab Refills:  0  
     
   
   
   
  
 cephALEXin 500 mg capsule Commonly known as:  Yessy Nipper Your last dose was: Your next dose is:    
   
   
 Dose:  500 mg Take 1 Cap by mouth three (3) times daily for 8 doses. Quantity:  8 Cap Refills:  0 CONTINUE these medications which have NOT CHANGED Dose & Instructions Dispensing Information Comments Morning Noon Evening Bedtime ADULT MULTI PLUS OMEGA-3 PO Your last dose was: Your next dose is: Take  by mouth. 1 per day Refills:  0 AMBIEN 10 mg tablet Generic drug:  zolpidem Your last dose was: Your next dose is: Take  by mouth nightly. Refills:  0  
     
   
   
   
  
 aspirin 81 mg chewable tablet Your last dose was: Your next dose is:    
   
   
 Dose:  81 mg Take 1 Tab by mouth daily. Refills:  0  
     
   
   
   
  
 carvedilol 25 mg tablet Commonly known as:  Sherif Lawman Your last dose was: Your next dose is:    
   
   
 Dose:  25 mg Take 1 Tab by mouth two (2) times daily (with meals). Indications: LEFT VENTRICULAR DYSFUNCTION FOLLOWING MI  
 Quantity:  60 Tab Refills:  11  
     
   
   
   
  
 CHOLESTYRAMINE LIGHT 4 gram powder Generic drug:  Cholestyramine-Aspartame Your last dose was: Your next dose is:    
   
   
 Dose:  8 g Take 8 g by mouth every morning. Refills:  0 DULoxetine 60 mg capsule Commonly known as:  CYMBALTA Your last dose was: Your next dose is:    
   
   
 Dose:  60 mg Take 60 mg by mouth every morning. Refills:  0  
     
   
   
   
  
 losartan 25 mg tablet Commonly known as:  COZAAR Your last dose was: Your next dose is:    
   
   
 Dose:  25 mg Take 1 Tab by mouth every morning. Quantity:  90 Tab Refills:  3  
     
   
   
   
  
 nitroglycerin 0.4 mg SL tablet Commonly known as:  NITROSTAT Your last dose was: Your next dose is:    
   
   
 Dose:  0.4 mg  
1 Tab by SubLINGual route every five (5) minutes as needed for Chest Pain. Quantity:  1 Bottle Refills:  6 NORVASC 10 mg tablet Generic drug:  amLODIPine Your last dose was: Your next dose is:    
   
   
 Dose:  10 mg Take 10 mg by mouth every morning. Refills:  0  
     
   
   
   
  
 OCUVITE WITH LUTEIN PO Your last dose was: Your next dose is: Take  by mouth. 25mg-5mg daily Refills:  0  
     
   
   
   
  
 RESTASIS 0.05 % ophthalmic emulsion Generic drug:  cycloSPORINE Your last dose was: Your next dose is:    
   
   
 Dose:  1 Drop Administer 1 Drop to both eyes two (2) times a day. Refills:  0  
     
   
   
   
  
 spironolactone 25 mg tablet Commonly known as:  ALDACTONE Your last dose was: Your next dose is:    
   
   
 Dose:  25 mg Take 1 Tab by mouth every morning. Quantity:  90 Tab Refills:  3  
     
   
   
   
  
 ticagrelor 90 mg tablet Commonly known as:  [employer] Your last dose was: Your next dose is:    
   
   
 Dose:  90 mg Take 1 Tab by mouth every twelve (12) hours every twelve (12) hours. Quantity:  180 Tab Refills:  5 XANAX 0.25 mg tablet Generic drug:  ALPRAZolam  
   
Your last dose was: Your next dose is:    
   
   
 Dose:  0.25 mg Take 0.25 mg by mouth two (2) times daily as needed. Refills:  0 Where to Get Your Medications These medications were sent to [id number] [place] [address] Phone:  [phone]  
  amiodarone 200 mg tablet  
 cephALEXin 500 mg capsule

## 2017-03-17 NOTE — IP AVS SNAPSHOT
[address] [address] 
[phone] Patient: Villa Clemons MRN: [id number] LFX:6/03/[birth year]17 You are allergic to the following Allergen Reactions Latex Rash Lactose Diarrhea Adhesive Rash Atorvastatin Other (comments) Muscle aches Augmentin (Amoxicillin-Pot Clavulanate) Other (comments) GI Upset Codeine Hives Ezetimibe Other (comments) Muscle aches Fluoxetine Cough Percocet (Oxycodone-Acetaminophen) Hives Potassium Nausea Only Pravastatin Myalgia Prednisone Nausea and Vomiting Simvastatin Unknown (comments) Wellbutrin (Bupropion) Unknown (comments) Recent Documentation Height Weight Breastfeeding? BMI OB Status Smoking Status 1.74 m 70.4 kg No 23.27 kg/m2 Postmenopausal Never Smoker Emergency Contacts Name Discharge Info Relation Home Work Mobile Mallory Forman DISCHARGE CAREGIVER [3] Sister [23] [phone] About your hospitalization You were admitted on:  March 17, 2017 You last received care in the:  [facility] 3 CLINICAL OBSERVATION You were discharged on:  March 18, 2017 Unit phone number:  [phone] Why you were hospitalized Your primary diagnosis was:  Chronic Systolic Chf (Congestive Heart Failure) (Hcc) Your diagnoses also included:  Left Bundle Branch Block, Biventricular Icd (Implantable Cardioverter-Defibrillator) In Place Providers Seen During Your Hospitalizations Provider Role Specialty Primary office phone Remy Holloway MD Attending Provider Cardiology [phone] Your Primary Care Physician (PCP) Primary Care Physician Office Phone Office Fax [address] [phone] Follow-up Information Follow up With Details Comments Contact Info Jabier Khoury MD  March 28 at 11:00[facility] office w device check  [address] [facility] [address] [phone] Alban Douglass MD  As needed [address] [facility] [address] 
[phone] Your Appointments Monday March 20, 2017 11:00 AM EDT  
CARDIAC REHAB with Pinky Gutierrez [facility] CARDIOPULM REHAB ([facility]) [address]  
[phone] Wednesday March 22, 2017 11:00 AM EDT  
CARDIAC REHAB with Pinky Gutierrez [facility] CARDIOPULM REHAB ([facility]) [address]  
[phone] Monday March 27, 2017 11:00 AM EDT  
CARDIAC REHAB with Pinky Gutierrez [facility] CARDIOPULM REHAB ([facility]) [address] [place] [address]  
[phone] Tuesday March 28, 2017 11:00 AM EDT  
OFFICE DEVICE CHECKS with GVLLE DEVICE 39 [facility] Card[facility] ([facility]) [address] 
[address] [place] [facility]  
[phone] Wednesday March 29, 2017 11:00 AM EDT  
CARDIAC REHAB with Pinky Gutierrez [facility] CARDIOPULM REHAB ([facility]) [address]  
[phone] Monday April 03, 2017 11:00 AM EDT  
CARDIAC REHAB with Pinky Gutierrez [facility] CARDIOPULM REHAB ([facility]) [address]  
[phone] Wednesday April 05, 2017 11:00 AM EDT  
CARDIAC REHAB with Pinky Gutierrez [facility] CARDIOPULM REHAB ([facility]) [address]  
[phone] Monday April 10, 2017 11:00 AM EDT  
CARDIAC REHAB with Pinky Gutierrez [facility] CARDIOPULM REHAB ([facility]) [address]  
[phone] Wednesday April 12, 2017 11:00 AM EDT  
CARDIAC REHAB with Jihan PETERSON CARDIOPULM REHAB ([facility]) 1100 Annie Knight [facility]  
[phone] Monday April 17, 2017 11:00 AM EDT  
CARDIAC REHAB with Angela Ortiz [facility] CARDIOPULM REHAB ([facility]) [address] [facility]  
[phone] Wednesday April 19, 2017 11:00 AM EDT  
CARDIAC REHAB with Angela Ortiz [facility] CARDIOPULM REHAB ([facility]) [address] Annie Knight [facility]  
[phone] Monday April 24, 2017 11:00 AM EDT  
CARDIAC REHAB with Jerralsnehal Ortiz [facility] CARDIOPULM REHAB ([facility]) 1100 [facility] Laisha Knight [facility]  
[phone] Wednesday April 26, 2017 11:00 AM EDT  
CARDIAC REHAB with Mauriralsnehal Ortiz [facility] CARDIOPULM REHAB ([facility]) 1100 [facility] Laisha Knight [facility]  
[phone] Monday May 01, 2017 11:00 AM EDT  
CARDIAC REHAB with Angela Ortiz [facility] CARDIOPULM REHAB ([facility]) 1100 Annie Interiano [address] [place] [address]  
[phone] Current Discharge Medication List  
  
START taking these medications Dose & Instructions Dispensing Information Comments Morning Noon Evening Bedtime  
 amiodarone 200 mg tablet Commonly known as:  CORDARONE Your last dose was: Your next dose is:    
   
   
 Dose:  200 mg Take 1 Tab by mouth two (2) times a day. Quantity:  60 Tab Refills:  0  
     
   
   
   
  
 cephALEXin 500 mg capsule Commonly known as:  Emily Ekaterina Your last dose was: Your next dose is:    
   
   
 Dose:  500 mg Take 1 Cap by mouth three (3) times daily for 8 doses. Quantity:  8 Cap Refills:  0 CONTINUE these medications which have NOT CHANGED Dose & Instructions Dispensing Information Comments Morning Noon Evening Bedtime ADULT MULTI PLUS OMEGA-3 PO Your last dose was: Your next dose is: Take  by mouth. 1 per day Refills:  0 AMBIEN 10 mg tablet Generic drug:  zolpidem Your last dose was: Your next dose is: Take  by mouth nightly. Refills:  0  
     
   
   
   
  
 aspirin 81 mg chewable tablet Your last dose was: Your next dose is:    
   
   
 Dose:  81 mg Take 1 Tab by mouth daily. Refills:  0  
     
   
   
   
  
 carvedilol 25 mg tablet Commonly known as:  Macel Rajiv Your last dose was: Your next dose is:    
   
   
 Dose:  25 mg Take 1 Tab by mouth two (2) times daily (with meals). Indications: LEFT VENTRICULAR DYSFUNCTION FOLLOWING MI  
 Quantity:  60 Tab Refills:  11  
     
   
   
   
  
 CHOLESTYRAMINE LIGHT 4 gram powder Generic drug:  Cholestyramine-Aspartame Your last dose was: Your next dose is:    
   
   
 Dose:  8 g Take 8 g by mouth every morning. Refills:  0 DULoxetine 60 mg capsule Commonly known as:  CYMBALTA Your last dose was: Your next dose is:    
   
   
 Dose:  60 mg Take 60 mg by mouth every morning. Refills:  0  
     
   
   
   
  
 losartan 25 mg tablet Commonly known as:  COZAAR Your last dose was: Your next dose is:    
   
   
 Dose:  25 mg Take 1 Tab by mouth every morning. Quantity:  90 Tab Refills:  3  
     
   
   
   
  
 nitroglycerin 0.4 mg SL tablet Commonly known as:  NITROSTAT Your last dose was: Your next dose is:    
   
   
 Dose:  0.4 mg  
1 Tab by SubLINGual route every five (5) minutes as needed for Chest Pain. Quantity:  1 Bottle Refills:  6 NORVASC 10 mg tablet Generic drug:  amLODIPine Your last dose was: Your next dose is:    
   
   
 Dose:  10 mg Take 10 mg by mouth every morning. Refills:  0  
     
   
   
   
  
 OCUVITE WITH LUTEIN PO Your last dose was: Your next dose is: Take  by mouth. 25mg-5mg daily Refills:  0  
     
   
   
   
  
 RESTASIS 0.05 % ophthalmic emulsion Generic drug:  cycloSPORINE Your last dose was: Your next dose is:    
   
   
 Dose:  1 Drop Administer 1 Drop to both eyes two (2) times a day. Refills:  0  
     
   
   
   
  
 spironolactone 25 mg tablet Commonly known as:  ALDACTONE Your last dose was: Your next dose is:    
   
   
 Dose:  25 mg Take 1 Tab by mouth every morning. Quantity:  90 Tab Refills:  3  
     
   
   
   
  
 ticagrelor 90 mg tablet Commonly known as:  [employer] Your last dose was: Your next dose is:    
   
   
 Dose:  90 mg Take 1 Tab by mouth every twelve (12) hours every twelve (12) hours. Quantity:  180 Tab Refills:  5 XANAX 0.25 mg tablet Generic drug:  ALPRAZolam  
   
Your last dose was: Your next dose is:    
   
   
 Dose:  0.25 mg Take 0.25 mg by mouth two (2) times daily as needed. Refills:  0 Where to Get Your Medications These medications were sent to [facility]/pharmacy #[id number] Mehdi [address] [id number] Phone:  [phone]  
  amiodarone 200 mg tablet  
 cephALEXin 500 mg capsule Discharge Instructions Pacemaker Placement for Heart Failure: What to Expect at Home Your Recovery A pacemaker for heart failure is a biventricular pacemaker (say \"by-maged-TRICK-rhonda-robbie\"). Treatment that uses this type of pacemaker is called cardiac resynchronization therapy (CRT). When you have heart failure, the lower chambers of your heart may not pump at the same time. The pacemaker sends painless electrical signals to your heart. These signals make the chambers pump at the same time. This can help your heart pump blood better and help you feel better.  
Your pacemaker may be combined with an ICD, or implantable cardioverter-defibrillator. It can control abnormal heart rhythms. This can prevent sudden death. Your chest may be sore where the doctor made the cut (incision) and put in the pacemaker. You also may have a bruise and mild swelling. These symptoms usually get better in 1 to 2 weeks. You may feel a hard ridge along the incision. This usually gets softer in the months after surgery. You may be able to see or feel the outline of the pacemaker under your skin. You will probably be able to go back to work or your usual routine 1 to 2 weeks after surgery. Pacemaker batteries usually last 5 to 15 years. Your doctor will talk to you about how often you will need to have your pacemaker checked. You can safely use most household and office electronics such as kitchen appliances, electric power tools, and computers. You will need to stay away from things with strong magnetic and electrical fields such as an MRI machine (unless your pacemaker is safe for an MRI), welding equipment, and power generators. You can use a cell phone, but keep it at least 6 inches away from your pacemaker. Check with your doctor about what you need to stay away from, what you need to use with care, and what is okay to use. This care sheet gives you a general idea about how long it will take for you to recover. But each person recovers at a different pace. Follow the steps below to get better as quickly as possible. How can you care for yourself at home? Activity · Rest when you feel tired. · Be active. Walking is a good choice. · For 4 to 6 weeks: ¨ Avoid activities that strain your chest or upper arm muscles. This includes pushing a  or vacuum, mopping floors, swimming, or swinging a golf club or tennis racquet. ¨ Do not raise your arm (the one on the side of your body where the pacemaker is located) above your shoulder. ¨ Allow your body to heal. Don't move quickly or lift anything heavy until you are feeling better. · Many people are able to return to work within 1 to 2 weeks after surgery. · Ask your doctor when it is okay for you to have sex. Diet · You can eat your normal diet. If your stomach is upset, try bland, low-fat foods like plain rice, broiled chicken, toast, and yogurt. Medicines · Your doctor will tell you if and when you can restart your medicines. He or she will also give you instructions about taking any new medicines. · If you take aspirin or some other blood thinner, be sure to talk to your doctor. He or she will tell you if and when to start taking this medicine again. Make sure that you understand exactly what your doctor wants you to do. · Be safe with medicines. Read and follow all instructions on the label. ¨ If the doctor gave you a prescription medicine for pain, take it as prescribed. ¨ If you are not taking a prescription pain medicine, ask your doctor if you can take an over-the-counter medicine. ¨ Do not take aspirin, ibuprofen (Advil, Motrin), naproxen (Aleve), or other nonsteroidal anti-inflammatory drugs (NSAIDs) unless your doctor says it is okay. · If your doctor prescribed antibiotics, take them as directed. Do not stop taking them just because you feel better. You need to take the full course of antibiotics. Incision care · If you have strips of tape on the incision, leave the tape on for a week or until it falls off. · Keep the incision dry while it heals. Your doctor may recommend sponge baths for about 7 days, but do not get the incision wet. Your doctor will let you know when you may take showers. After a shower, pat the incision dry. · Don't use hydrogen peroxide or alcohol on the incision, which can slow healing. You may cover the area with a gauze bandage if it oozes fluid or rubs against clothing. Change the bandage every day. · Do not take a bath or get into a hot tub for the first 2 weeks, or until your doctor tells you it is okay. Other instructions · Keep a medical ID card with you at all times that says you have a pacemaker. The card should include the  and model information. · Wear medical alert jewelry stating that you have a pacemaker. You can buy this at most drugstores. · Check your pulse as directed by your doctor. · Have your pacemaker checked as often as your doctor recommends. In some cases, this may be done over the phone or the Internet. Your doctor will give you instructions about how to do this. Follow-up care is a key part of your treatment and safety. Be sure to make and go to all appointments, and call your doctor if you are having problems. It's also a good idea to know your test results and keep a list of the medicines you take. When should you call for help? Call 911 anytime you think you may need emergency care. For example, call if: 
· You passed out (lost consciousness). · You have severe trouble breathing. · You have sudden chest pain and shortness of breath, or you cough up blood. · You have symptoms of a heart attack. These may include: ¨ Chest pain or pressure, or a strange feeling in the chest. 
¨ Sweating. ¨ Shortness of breath. ¨ Nausea or vomiting. ¨ Pain, pressure, or a strange feeling in the back, neck, jaw, or upper belly or in one or both shoulders or arms. ¨ Lightheadedness or sudden weakness. ¨ A fast or irregular heartbeat. After you call 911, the  may tell you to chew 1 adult-strength or 2 to 4 low-dose aspirin. Wait for an ambulance. Do not try to drive yourself. · You have symptoms of a stroke. These may include: 
¨ Sudden numbness, tingling, weakness, or loss of movement in your face, arm, or leg, especially on only one side of your body. ¨ Sudden vision changes. ¨ Sudden trouble speaking. ¨ Sudden confusion or trouble understanding simple statements. ¨ Sudden problems with walking or balance. ¨ A sudden, severe headache that is different from past headaches. Call your doctor now or seek immediate medical care if: 
· Your heartbeat feels very fast or slow, skips beats, or flutters. · You are dizzy or lightheaded, or you feel like you may faint. · You have new or increased shortness of breath. · You have pain that does not get better after you take pain medicine. · You have loose stitches, or your incision comes open. · Bright red blood has soaked through the bandage over your incision. · You have signs of infection, such as: 
¨ Increased pain, swelling, warmth, or redness. ¨ Red streaks leading from the incision. ¨ Pus draining from the incision. ¨ A fever. · You have signs of a blood clot, such as: 
¨ Pain in your calf, back of the knee, thigh, or groin. ¨ Redness and swelling in your leg or groin. Watch closely for changes in your health, and be sure to contact your doctor if: 
· You have any problems with your pacemaker. Where can you learn more? Go to http://tammy-jesús.info/. Enter T984 in the search box to learn more about \"Pacemaker Placement for Heart Failure: What to Expect at Home. \" Current as of: August 4, 2016 Content Version: 11.1 © 8740-2408 [employer]. Care instructions adapted under license by [employer] (which disclaims liability or warranty for this information). If you have questions about a medical condition or this instruction, always ask your healthcare professional. [address] [address] any warranty or liability for your use of this information. DISCHARGE SUMMARY from Nurse The following personal items are in your possession at time of discharge: 
 
Dental Appliances: None Hearing Aids/Status: Bilateral 
Home Medications: Sent to pharmacy Jewelry: None Clothing: Shirt, Pants, Socks, Footwear PATIENT INSTRUCTIONS: 
 
After general anesthesia or intravenous sedation, for 24 hours or while taking prescription Narcotics: · Limit your activities · Do not drive and operate hazardous machinery · Do not make important personal or business decisions · Do  not drink alcoholic beverages · If you have not urinated within 8 hours after discharge, please contact your surgeon on call. Report the following to your surgeon: 
· Excessive pain, swelling, redness or odor of or around the surgical area · Temperature over 100.5 · Nausea and vomiting lasting longer than 4 hours or if unable to take medications · Any signs of decreased circulation or nerve impairment to extremity: change in color, persistent  numbness, tingling, coldness or increase pain · Any questions What to do at Home: *  Please give a list of your current medications to your Primary Care Provider. *  Please update this list whenever your medications are discontinued, doses are 
    changed, or new medications (including over-the-counter products) are added. *  Please carry medication information at all times in case of emergency situations. These are general instructions for a healthy lifestyle: No smoking/ No tobacco products/ Avoid exposure to second hand smoke Surgeon General's Warning:  Quitting smoking now greatly reduces serious risk to your health. Obesity, smoking, and sedentary lifestyle greatly increases your risk for illness A healthy diet, regular physical exercise & weight monitoring are important for maintaining a healthy lifestyle You may be retaining fluid if you have a history of heart failure or if you experience any of the following symptoms:  Weight gain of 3 pounds or more overnight or 5 pounds in a week, increased swelling in our hands or feet or shortness of breath while lying flat in bed. Please call your doctor as soon as you notice any of these symptoms; do not wait until your next office visit. Recognize signs and symptoms of STROKE: 
 
F-face looks uneven A-arms unable to move or move unevenly S-speech slurred or non-existent T-time-call 911 as soon as signs and symptoms begin-DO NOT go Back to bed or wait to see if you get better-TIME IS BRAIN. Warning Signs of HEART ATTACK Call 911 if you have these symptoms: 
? Chest discomfort. Most heart attacks involve discomfort in the center of the chest that lasts more than a few minutes, or that goes away and comes back. It can feel like uncomfortable pressure, squeezing, fullness, or pain. ? Discomfort in other areas of the upper body. Symptoms can include pain or discomfort in one or both arms, the back, neck, jaw, or stomach. ? Shortness of breath with or without chest discomfort. ? Other signs may include breaking out in a cold sweat, nausea, or lightheadedness. Don't wait more than five minutes to call [address]! Fast action can save your life. Calling 911 is almost always the fastest way to get lifesaving treatment. Emergency Medical Services staff can begin treatment when they arrive  up to an hour sooner than if someone gets to the hospital by car. The discharge information has been reviewed with the patient. The patient verbalized understanding. Discharge medications reviewed with the patient and appropriate educational materials and side effects teaching were provided. Discharge Orders None [employer] Announcement We are excited to announce that we are making your provider's discharge notes available to you in [employer]. You will see these notes when they are completed and signed by the physician that discharged you from your recent hospital stay. If you have any questions or concerns about any information you see in [employer], please call the Health Information Department where you were seen or reach out to your Primary Care Provider for more information about your plan of care. Introducing \[facility] [place] [facility]\"" & [facility] SERVICES!    
 Ivory Torres introduces [employer] patient portal. Now you can access parts of your medical record, email your doctor's office, and request medication refills online. 1. In your internet browser, go to [email or web address] 2. Click on the First Time User? Click Here link in the Sign In box. You will see the New Member Sign Up page. 3. Enter your [employer] Access Code exactly as it appears below. You will not need to use this code after youve completed the sign-up process. If you do not sign up before the expiration date, you must request a new code. · [employer] Access Code: [email or web address] Expires: 6/4/2017  3:50 PM 
 
4. Enter the last four digits of your Social Security Number (xxxx) and Date of Birth (mm/dd/yyyy) as indicated and click Submit. You will be taken to the next sign-up page. 5. Create a [employer] ID. This will be your [employer] login ID and cannot be changed, so think of one that is secure and easy to remember. 6. Create a [employer] password. You can change your password at any time. 7. Enter your Password Reset Question and Answer. This can be used at a later time if you forget your password. 8. Enter your e-mail address. You will receive e-mail notification when new information is available in [address] 9. Click Sign Up. You can now view and download portions of your medical record. 10. Click the Download Summary menu link to download a portable copy of your medical information. If you have questions, please visit the Frequently Asked Questions section of the [employer] website. Remember, [employer] is NOT to be used for urgent needs. For medical emergencies, dial 911. Now available from your iPhone and Android! General Information Please provide this summary of care documentation to your next provider. Patient Signature:  ____________________________________________________________ Date:  ____________________________________________________________  
  
Betsey Nelia  Provider Signature: ____________________________________________________________ Date:  ____________________________________________________________

## 2017-03-17 NOTE — PROGRESS NOTES
Bedside and Verbal shift change report given to self (oncoming nurse) by Marie Suero RN (offgoing nurse). Report included the following information SBAR, Kardex, MAR and Recent Results. Left subclavian site visualized with offgoing RN - no bleeding or hematoma noted.  Pea sized drainage on aquacel dressing

## 2017-03-17 NOTE — PROGRESS NOTES
Report received from [facility]. Procedural findings communicated. Intra procedural  medication administration reviewed. Progression of care discussed. Patient received into [address] [facility] 3 post procedure.      Incision site without bleeding or swelling yes    Dressing dry and intact yes    Patient instructed to limit movement to left upper extremity    Routine post procedural vital signs and site assessment initiated yes

## 2017-03-17 NOTE — ANESTHESIA PREPROCEDURE EVALUATION
Anesthetic History   No history of anesthetic complications            Review of Systems / Medical History  Patient summary reviewed and pertinent labs reviewed    Pulmonary  Within defined limits                 Neuro/Psych   Within defined limits           Cardiovascular    Hypertension: well controlled      CHF (EF 30%)    CAD, PAD, cardiac stents (in 2008 and again in 12/2016) and hyperlipidemia    Exercise tolerance: <4 METS  Comments: LBBB   GI/Hepatic/Renal  Within defined limits             Comments: S/p hiatal hernia repair and no further issues with reflux Endo/Other        Arthritis     Other Findings              Physical Exam    Airway  Mallampati: II  TM Distance: > 6 cm  Neck ROM: normal range of motion   Mouth opening: Normal     Cardiovascular  Regular rate and rhythm,  S1 and S2 normal,  no murmur, click, rub, or gallop  Rhythm: regular  Rate: normal         Dental  No notable dental hx       Pulmonary  Breath sounds clear to auscultation               Abdominal  GI exam deferred       Other Findings            Anesthetic Plan    ASA: 4  Anesthesia type: total IV anesthesia          Induction: Intravenous  Anesthetic plan and risks discussed with: Patient

## 2017-03-18 ENCOUNTER — APPOINTMENT (OUTPATIENT)
Dept: GENERAL RADIOLOGY | Age: 82
End: 2017-03-18
Attending: INTERNAL MEDICINE
Payer: MEDICARE

## 2017-03-18 ENCOUNTER — HOSPITAL ENCOUNTER (OUTPATIENT)
Dept: GENERAL RADIOLOGY | Age: 82
Setting detail: OBSERVATION
End: 2017-03-18
Attending: INTERNAL MEDICINE
Payer: MEDICARE

## 2017-03-18 VITALS
SYSTOLIC BLOOD PRESSURE: 171 MMHG | BODY MASS INDEX: 23 KG/M2 | RESPIRATION RATE: 18 BRPM | WEIGHT: 155.3 LBS | TEMPERATURE: 97.1 F | OXYGEN SATURATION: 96 % | HEART RATE: 82 BPM | DIASTOLIC BLOOD PRESSURE: 52 MMHG | HEIGHT: 69 IN

## 2017-03-18 LAB
ANION GAP BLD CALC-SCNC: 13 MMOL/L (ref 7–16)
BUN SERPL-MCNC: 16 MG/DL (ref 8–23)
CALCIUM SERPL-MCNC: 9 MG/DL (ref 8.3–10.4)
CHLORIDE SERPL-SCNC: 109 MMOL/L (ref 98–107)
CO2 SERPL-SCNC: 20 MMOL/L (ref 21–32)
CREAT SERPL-MCNC: 1.16 MG/DL (ref 0.6–1)
GLUCOSE SERPL-MCNC: 104 MG/DL (ref 65–100)
MAGNESIUM SERPL-MCNC: 1.9 MG/DL (ref 1.8–2.4)
POTASSIUM SERPL-SCNC: 4.2 MMOL/L (ref 3.5–5.1)
SODIUM SERPL-SCNC: 142 MMOL/L (ref 136–145)
T4 FREE SERPL-MCNC: 1.1 NG/DL (ref 0.78–1.46)
TSH SERPL DL<=0.005 MIU/L-ACNC: 2.93 UIU/ML (ref 0.36–3.74)

## 2017-03-18 PROCEDURE — 99218 HC RM OBSERVATION: CPT

## 2017-03-18 PROCEDURE — 36415 COLL VENOUS BLD VENIPUNCTURE: CPT | Performed by: INTERNAL MEDICINE

## 2017-03-18 PROCEDURE — 80048 BASIC METABOLIC PNL TOTAL CA: CPT | Performed by: INTERNAL MEDICINE

## 2017-03-18 PROCEDURE — 74011250637 HC RX REV CODE- 250/637: Performed by: INTERNAL MEDICINE

## 2017-03-18 PROCEDURE — 83735 ASSAY OF MAGNESIUM: CPT | Performed by: INTERNAL MEDICINE

## 2017-03-18 PROCEDURE — 71020 XR CHEST PA LAT: CPT

## 2017-03-18 PROCEDURE — 84439 ASSAY OF FREE THYROXINE: CPT | Performed by: INTERNAL MEDICINE

## 2017-03-18 PROCEDURE — 74011250636 HC RX REV CODE- 250/636: Performed by: INTERNAL MEDICINE

## 2017-03-18 PROCEDURE — 84443 ASSAY THYROID STIM HORMONE: CPT | Performed by: INTERNAL MEDICINE

## 2017-03-18 RX ORDER — CEPHALEXIN 500 MG/1
500 CAPSULE ORAL 3 TIMES DAILY
Qty: 8 CAP | Refills: 0 | Status: SHIPPED | OUTPATIENT
Start: 2017-03-18 | End: 2017-03-21

## 2017-03-18 RX ORDER — CEPHALEXIN 500 MG/1
500 CAPSULE ORAL 3 TIMES DAILY
Qty: 8 CAP | Refills: 0 | Status: SHIPPED | OUTPATIENT
Start: 2017-03-18 | End: 2017-03-18

## 2017-03-18 RX ORDER — AMIODARONE HYDROCHLORIDE 200 MG/1
200 TABLET ORAL 2 TIMES DAILY
Qty: 60 TAB | Refills: 0 | Status: SHIPPED | OUTPATIENT
Start: 2017-03-18 | End: 2017-04-13 | Stop reason: DRUGHIGH

## 2017-03-18 RX ORDER — AMIODARONE HYDROCHLORIDE 200 MG/1
200 TABLET ORAL 2 TIMES DAILY
Qty: 60 TAB | Refills: 0 | Status: SHIPPED | OUTPATIENT
Start: 2017-03-18 | End: 2017-03-18

## 2017-03-18 RX ADMIN — TICAGRELOR 90 MG: 90 TABLET ORAL at 06:07

## 2017-03-18 RX ADMIN — CARVEDILOL 25 MG: 25 TABLET, FILM COATED ORAL at 09:02

## 2017-03-18 RX ADMIN — AMIODARONE HYDROCHLORIDE 200 MG: 200 TABLET ORAL at 09:01

## 2017-03-18 RX ADMIN — AMLODIPINE BESYLATE 10 MG: 10 TABLET ORAL at 09:01

## 2017-03-18 RX ADMIN — Medication 81 MG: at 09:01

## 2017-03-18 RX ADMIN — CEFAZOLIN 2 G: 1 INJECTION, POWDER, FOR SOLUTION INTRAMUSCULAR; INTRAVENOUS; PARENTERAL at 03:36

## 2017-03-18 RX ADMIN — DULOXETIN HYDROCHLORIDE 60 MG: 60 CAPSULE, DELAYED RELEASE ORAL at 09:10

## 2017-03-18 RX ADMIN — Medication 10 ML: at 06:08

## 2017-03-18 RX ADMIN — LOSARTAN POTASSIUM 25 MG: 25 TABLET ORAL at 09:01

## 2017-03-18 RX ADMIN — SPIRONOLACTONE 25 MG: 25 TABLET, FILM COATED ORAL at 09:01

## 2017-03-18 NOTE — DISCHARGE INSTRUCTIONS
Pacemaker Placement for Heart Failure: What to Expect at [facility] pacemaker for heart failure is a biventricular pacemaker (say \"louise-maged-TRICK-deidrah-robbie\"). Treatment that uses this type of pacemaker is called cardiac resynchronization therapy (CRT). When you have heart failure, the lower chambers of your heart may not pump at the same time. The pacemaker sends painless electrical signals to your heart. These signals make the chambers pump at the same time. This can help your heart pump blood better and help you feel better. Your pacemaker may be combined with an ICD, or implantable cardioverter-defibrillator. It can control abnormal heart rhythms. This can prevent sudden death. Your chest may be sore where the doctor made the cut (incision) and put in the pacemaker. You also may have a bruise and mild swelling. These symptoms usually get better in 1 to 2 weeks. You may feel a hard ridge along the incision. This usually gets softer in the months after surgery. You may be able to see or feel the outline of the pacemaker under your skin. You will probably be able to go back to work or your usual routine 1 to 2 weeks after surgery. Pacemaker batteries usually last 5 to 15 years. Your doctor will talk to you about how often you will need to have your pacemaker checked. You can safely use most household and office electronics such as kitchen appliances, electric power tools, and computers. You will need to stay away from things with strong magnetic and electrical fields such as an MRI machine (unless your pacemaker is safe for an MRI), welding equipment, and power generators. You can use a cell phone, but keep it at least 6 inches away from your pacemaker. Check with your doctor about what you need to stay away from, what you need to use with care, and what is okay to use. This care sheet gives you a general idea about how long it will take for you to recover.  But each person recovers at a different pace. Follow the steps below to get better as quickly as possible. How can you care for yourself at home? Activity  · Rest when you feel tired. · Be active. Walking is a good choice. · For 4 to 6 weeks:  ¨ Avoid activities that strain your chest or upper arm muscles. This includes pushing a  or vacuum, mopping floors, swimming, or swinging a golf club or tennis racquet. ¨ Do not raise your arm (the one on the side of your body where the pacemaker is located) above your shoulder. ¨ Allow your body to heal. Don't move quickly or lift anything heavy until you are feeling better. · Many people are able to return to work within 1 to 2 weeks after surgery. · Ask your doctor when it is okay for you to have sex. Diet  · You can eat your normal diet. If your stomach is upset, try bland, low-fat foods like plain rice, broiled chicken, toast, and yogurt. Medicines  · Your doctor will tell you if and when you can restart your medicines. He or she will also give you instructions about taking any new medicines. · If you take aspirin or some other blood thinner, be sure to talk to your doctor. He or she will tell you if and when to start taking this medicine again. Make sure that you understand exactly what your doctor wants you to do. · Be safe with medicines. Read and follow all instructions on the label. ¨ If the doctor gave you a prescription medicine for pain, take it as prescribed. ¨ If you are not taking a prescription pain medicine, ask your doctor if you can take an over-the-counter medicine. ¨ Do not take aspirin, ibuprofen (Advil, Motrin), naproxen (Aleve), or other nonsteroidal anti-inflammatory drugs (NSAIDs) unless your doctor says it is okay. · If your doctor prescribed antibiotics, take them as directed. Do not stop taking them just because you feel better. You need to take the full course of antibiotics.   Incision care  · If you have strips of tape on the incision, leave the tape on for a week or until it falls off. · Keep the incision dry while it heals. Your doctor may recommend sponge baths for about 7 days, but do not get the incision wet. Your doctor will let you know when you may take showers. After a shower, pat the incision dry. · Don't use hydrogen peroxide or alcohol on the incision, which can slow healing. You may cover the area with a gauze bandage if it oozes fluid or rubs against clothing. Change the bandage every day. · Do not take a bath or get into a hot tub for the first 2 weeks, or until your doctor tells you it is okay. Other instructions  · Keep a medical ID card with you at all times that says you have a pacemaker. The card should include the  and model information. · Wear medical alert jewelry stating that you have a pacemaker. You can buy this at most [employer]. · Check your pulse as directed by your doctor. · Have your pacemaker checked as often as your doctor recommends. In some cases, this may be done over the phone or the Internet. Your doctor will give you instructions about how to do this. Follow-up care is a key part of your treatment and safety. Be sure to make and go to all appointments, and call your doctor if you are having problems. It's also a good idea to know your test results and keep a list of the medicines you take. When should you call for help? Call 911 anytime you think you may need emergency care. For example, call if:  · You passed out (lost consciousness). · You have severe trouble breathing. · You have sudden chest pain and shortness of breath, or you cough up blood. · You have symptoms of a heart attack. These may include:  ¨ Chest pain or pressure, or a strange feeling in the chest.  ¨ Sweating. ¨ Shortness of breath. ¨ Nausea or vomiting. ¨ Pain, pressure, or a strange feeling in the back, neck, jaw, or upper belly or in one or both shoulders or arms. ¨ Lightheadedness or sudden weakness.   ¨ A fast or irregular heartbeat. After you call 911, the  may tell you to chew 1 adult-strength or 2 to 4 low-dose aspirin. Wait for an ambulance. Do not try to drive yourself. · You have symptoms of a stroke. These may include:  ¨ Sudden numbness, tingling, weakness, or loss of movement in your face, arm, or leg, especially on only one side of your body. ¨ Sudden vision changes. ¨ Sudden trouble speaking. ¨ Sudden confusion or trouble understanding simple statements. ¨ Sudden problems with walking or balance. ¨ A sudden, severe headache that is different from past headaches. Call your doctor now or seek immediate medical care if:  · Your heartbeat feels very fast or slow, skips beats, or flutters. · You are dizzy or lightheaded, or you feel like you may faint. · You have new or increased shortness of breath. · You have pain that does not get better after you take pain medicine. · You have loose stitches, or your incision comes open. · Bright red blood has soaked through the bandage over your incision. · You have signs of infection, such as:  ¨ Increased pain, swelling, warmth, or redness. ¨ Red streaks leading from the incision. ¨ Pus draining from the incision. ¨ A fever. · You have signs of a blood clot, such as:  ¨ Pain in your calf, back of the knee, thigh, or groin. ¨ Redness and swelling in your leg or groin. Watch closely for changes in your health, and be sure to contact your doctor if:  · You have any problems with your pacemaker. Where can you learn more? Go to http://tammy-jesús.info/. Enter T984 in the search box to learn more about \"Pacemaker Placement for Heart Failure: What to Expect at Home. \"  Current as of: August 4, 2016  Content Version: 11.1  © 0823-2242 [employer]. Care instructions adapted under license by [employer] (which disclaims liability or warranty for this information).  If you have questions about a medical condition or this instruction, always ask your healthcare professional. [place] [address] any warranty or liability for your use of this information. DISCHARGE SUMMARY from Nurse    The following personal items are in your possession at time of discharge:    Dental Appliances: None     Hearing Aids/Status: Bilateral  Home Medications: Sent to pharmacy  Jewelry: None  Clothing: Shirt, Pants, Socks, Footwear                PATIENT INSTRUCTIONS:    After general anesthesia or intravenous sedation, for 24 hours or while taking prescription Narcotics:  · Limit your activities  · Do not drive and operate hazardous machinery  · Do not make important personal or business decisions  · Do  not drink alcoholic beverages  · If you have not urinated within 8 hours after discharge, please contact your surgeon on call. Report the following to your surgeon:  · Excessive pain, swelling, redness or odor of or around the surgical area  · Temperature over 100.5  · Nausea and vomiting lasting longer than 4 hours or if unable to take medications  · Any signs of decreased circulation or nerve impairment to extremity: change in color, persistent  numbness, tingling, coldness or increase pain  · Any questions        What to do at Home:    *  Please give a list of your current medications to your Primary Care Provider. *  Please update this list whenever your medications are discontinued, doses are      changed, or new medications (including over-the-counter products) are added. *  Please carry medication information at all times in case of emergency situations. These are general instructions for a healthy lifestyle:    No smoking/ No tobacco products/ Avoid exposure to second hand smoke    Surgeon General's Warning:  Quitting smoking now greatly reduces serious risk to your health.     Obesity, smoking, and sedentary lifestyle greatly increases your risk for illness    A healthy diet, regular physical exercise & weight monitoring are important for maintaining a healthy lifestyle    You may be retaining fluid if you have a history of heart failure or if you experience any of the following symptoms:  Weight gain of 3 pounds or more overnight or 5 pounds in a week, increased swelling in our hands or feet or shortness of breath while lying flat in bed. Please call your doctor as soon as you notice any of these symptoms; do not wait until your next office visit. Recognize signs and symptoms of STROKE:    F-face looks uneven    A-arms unable to move or move unevenly    S-speech slurred or non-existent    T-time-call 911 as soon as signs and symptoms begin-DO NOT go       Back to bed or wait to see if you get better-TIME IS BRAIN. Warning Signs of HEART ATTACK     Call 911 if you have these symptoms:   Chest discomfort. Most heart attacks involve discomfort in the center of the chest that lasts more than a few minutes, or that goes away and comes back. It can feel like uncomfortable pressure, squeezing, fullness, or pain.  Discomfort in other areas of the upper body. Symptoms can include pain or discomfort in one or both arms, the back, neck, jaw, or stomach.  Shortness of breath with or without chest discomfort.  Other signs may include breaking out in a cold sweat, nausea, or lightheadedness. Don't wait more than five minutes to call 911 - MINUTES MATTER! Fast action can save your life. Calling 911 is almost always the fastest way to get lifesaving treatment. Emergency Medical Services staff can begin treatment when they arrive -- up to an hour sooner than if someone gets to the hospital by car. The discharge information has been reviewed with the patient. The patient verbalized understanding. Discharge medications reviewed with the patient and appropriate educational materials and side effects teaching were provided.

## 2017-03-18 NOTE — PROGRESS NOTES
Bedside and Verbal shift change report given to PRATIK Boothe (oncoming nurse) by self (offgoing nurse). Report included the following information SBAR, Kardex, MAR and Recent Results. Left subclavian site visualized with oncoming RN. Dressing dry and intact. Some drainage on dressing. No active bleeding or hematoma noted.

## 2017-03-18 NOTE — PROGRESS NOTES
Discharge instructions were reviewed with patient. An opportunity was given for questions. All medications were reviewed, and information was given on the new medications. Patient verbalized understanding, and has no questions at this time.    Narcotics returned and paper signed by pt and placed on chart

## 2017-03-18 NOTE — DISCHARGE SUMMARY
[facility] Cardiology Discharge Summary     Patient ID:  Willy Troy  [id number]  80 y.o.  1935    Admit date: 3/17/2017    Discharge date and time:  3-18-17    Admitting Physician: Oni Benitez MD     Discharge Physician: Christian Sr NP/    Admission Diagnoses: Congestive heart failure, unspecified congestive heart failure chronicity, unspecified congestive heart failure type [facility]) [I50.9]    Discharge Diagnoses:   Patient Active Problem List    Diagnosis Date Noted    Biventricular ICD (implantable cardioverter-defibrillator) in place 03/17/2017    Chronic systolic CHF (congestive heart failure) ([facility] 03/13/2017    Cardiomyopathy ([facility] 11/28/2016    PVC's (premature ventricular contractions) 10/27/2016    Statin intolerance 10/27/2016    Hypertension 10/18/2016    CAD (coronary artery disease) 10/18/2016    Left bundle branch block 10/18/2016    Status post hip replacement 04/10/2013       Cardiology Procedures this admission:  Pacemaker Implantation, CXR  Consults: none    Hospital Course: Pt was admitted with chronic systolic heart failure needing Biv ICD for primary prevention. Pt was scheduled for an AM Admission for a Biv ICD implantation at [facility] on 3-17-17. Pt came in to [facility] and was taken to the cath lab by Dr. Tali Sanderson. Pt received a [employer] biventricular implantable cardioverter defibrillator pacemaker without complication. Follow up CXR showed no pneumothorax. Pt tolerated the procedure well and was taken to the telemetry floor for recovery. The following morning Pt was up feeling well without any complaints of CP, SOB or palpitations. Pt's left subclavian PM site was clean, dry and intact without hematoma. Pt's labs were WNL. The patient's Biv/ICD was interrogated prior to discharge with normal function. Pt was seen and examined by Dr. Jose Chambers and determined stable and ready for discharge.  Pt was instructed to follow PM discharge instructions given by nursing staff. The patient will be d/c home in amiodarone 200 mg bid until f/u appt with Dr. Juanita Simpson to consider decreasing dose. The patient will f/u with Dr. Juanita Simpson on March 28th at 11 am in [place] office for device check. DISPOSITION: The patient is being discharged home in stable condition on a low saturated fat, low cholesterol and low salt diet. Pt is instructed to advance activities as tolerated limited to fatigue or shortness of breath. Pt is instructed not to lift anything over 5-10 lbs with affected arm. No pushing or pulling or lifting arm above shoulder. See complete discharge instructions. Pt is instructed to watch PM site for bleeding/oozing, if seen Pt is instructed to apply firm pressure with clean cloth and call office at [phone]. Pt is instructed to watch for signs of infection which include increasing area of redness around site, fever/hot to touch or purulent drainage. Pt is instructed to call office or return to ER for immediate evaluation of any shortness of breath, chest pain or palpitations. Discharge Exam:   Visit Vitals    /52 (BP 1 Location: Right arm, BP Patient Position: Supine)    Pulse 82    Temp 97.1 °F (36.2 °C)    Resp 18    Ht 5' 8.5\" (1.74 m)    Wt 70.4 kg (155 lb 4.8 oz)    SpO2 96%    Breastfeeding No    BMI 23.27 kg/m2   Pt has been seen by Connie Torres: see his progress note for exam details.     Recent Results (from the past 24 hour(s))   CBC W/O DIFF    Collection Time: 03/17/17 11:47 AM   Result Value Ref Range    WBC 11.8 (H) 4.3 - 11.1 K/uL    RBC 4.72 4.05 - 5.25 M/uL    HGB 14.8 11.7 - 15.4 g/dL    HCT 43.8 35.8 - 46.3 %    MCV 92.8 79.6 - 97.8 FL    MCH 31.4 26.1 - 32.9 PG    MCHC 33.8 31.4 - 35.0 g/dL    RDW 14.2 11.9 - 14.6 %    PLATELET 858 053 - 148 K/uL    MPV 11.1 10.8 - 14.1 FL   MAGNESIUM    Collection Time: 03/17/17 11:47 AM   Result Value Ref Range    Magnesium 2.0 1.8 - 2.4 mg/dL   METABOLIC PANEL, BASIC    Collection Time: 03/17/17 11:47 AM Result Value Ref Range    Sodium 144 136 - 145 mmol/L    Potassium 4.6 3.5 - 5.1 mmol/L    Chloride 110 (H) 98 - 107 mmol/L    CO2 21 21 - 32 mmol/L    Anion gap 13 7 - 16 mmol/L    Glucose 116 (H) 65 - 100 mg/dL    BUN 14 8 - 23 MG/DL    Creatinine 1.25 (H) 0.6 - 1.0 MG/DL    GFR est AA 53 (L) >60 ml/min/1.73m2    GFR est non-AA 44 (L) >60 ml/min/1.73m2    Calcium 9.6 8.3 - 10.4 MG/DL   PROTHROMBIN TIME + INR    Collection Time: 03/17/17 11:47 AM   Result Value Ref Range    Prothrombin time 10.4 9.6 - 12.0 sec    INR 1.0 0.9 - 1.2     EKG, 12 LEAD, INITIAL    Collection Time: 03/17/17 12:01 PM   Result Value Ref Range    Systolic BP  mmHg    Diastolic BP  mmHg    Ventricular Rate 72 BPM    Atrial Rate 72 BPM    P-R Interval 168 ms    QRS Duration 140 ms    Q-T Interval 454 ms    QTC Calculation (Bezet) 497 ms    Calculated P Axis 69 degrees    Calculated R Axis -3 degrees    Calculated T Axis 157 degrees    Diagnosis       Sinus rhythm with frequent Premature ventricular complexes in a pattern of   bigeminy  Left bundle branch block  Abnormal ECG  When compared with ECG of 07-DEC-2016 06:16,  No significant change was found    Confirmed by MONTANA HOLLEY (), YO ALFARO ([id number]) on 3/17/2017 12:30:08 PM     EKG, 12 LEAD, INITIAL    Collection Time: 03/17/17  4:09 PM   Result Value Ref Range    Systolic BP  mmHg    Diastolic BP  mmHg    Ventricular Rate 81 BPM    Atrial Rate 81 BPM    P-R Interval 110 ms    QRS Duration 128 ms    Q-T Interval 462 ms    QTC Calculation (Bezet) 536 ms    Calculated P Axis 27 degrees    Calculated R Axis -83 degrees    Calculated T Axis 51 degrees    Diagnosis       AV sequential or dual chamber electronic pacemaker  When compared with ECG of 17-MAR-2017 12:01,  Electronic ventricular pacemaker has replaced Sinus rhythm  Confirmed by Seth Holley MD (), BRIDGETTE NEVILLE ([id number]) on 3/17/2017 3:21:99 PM     METABOLIC PANEL, BASIC    Collection Time: 03/18/17  4:15 AM   Result Value Ref Range    Sodium 142 136 - 145 mmol/L    Potassium 4.2 3.5 - 5.1 mmol/L    Chloride 109 (H) 98 - 107 mmol/L    CO2 20 (L) 21 - 32 mmol/L    Anion gap 13 7 - 16 mmol/L    Glucose 104 (H) 65 - 100 mg/dL    BUN 16 8 - 23 MG/DL    Creatinine 1.16 (H) 0.6 - 1.0 MG/DL    GFR est AA 58 (L) >60 ml/min/1.73m2    GFR est non-AA 48 (L) >60 ml/min/1.73m2    Calcium 9.0 8.3 - 10.4 MG/DL   TSH 3RD GENERATION    Collection Time: 03/18/17  4:15 AM   Result Value Ref Range    TSH 2.930 0.358 - 3.740 uIU/mL   T4, FREE    Collection Time: 03/18/17  4:15 AM   Result Value Ref Range    T4, Free 1.1 0.78 - 1.46 NG/DL   MAGNESIUM    Collection Time: 03/18/17  4:15 AM   Result Value Ref Range    Magnesium 1.9 1.8 - 2.4 mg/dL         Patient Instructions:     Current Discharge Medication List      START taking these medications    Details   amiodarone (CORDARONE) 200 mg tablet Take 1 Tab by mouth two (2) times a day. Qty: 60 Tab, Refills: 0    Associated Diagnoses: Chronic systolic CHF (congestive heart failure) (HCC)      cephALEXin (KEFLEX) 500 mg capsule Take 1 Cap by mouth three (3) times daily for 8 doses. Qty: 8 Cap, Refills: 0         CONTINUE these medications which have NOT CHANGED    Details   carvedilol (COREG) 25 mg tablet Take 1 Tab by mouth two (2) times daily (with meals). Indications: LEFT VENTRICULAR DYSFUNCTION FOLLOWING MI  Qty: 60 Tab, Refills: 11    Associated Diagnoses: Cardiomyopathy ([address]      losartan (COZAAR) 25 mg tablet Take 1 Tab by mouth every morning. Qty: 90 Tab, Refills: 3    Associated Diagnoses: Cardiomyopathy ([address]      MV-MIN/FA/D3/OM-3/DHA/EPA/FISH (ADULT MULTI PLUS OMEGA-3 PO) Take  by mouth. 1 per day      VITS A,C,E/LUTEIN/MINERALS (OCUVITE WITH LUTEIN PO) Take  by mouth. 25mg-5mg daily      spironolactone (ALDACTONE) 25 mg tablet Take 1 Tab by mouth every morning. Qty: 90 Tab, Refills: 3    Associated Diagnoses: Cardiomyopathy ([address]      aspirin 81 mg chewable tablet Take 1 Tab by mouth daily.       ticagrelor (BRILINTA) 90 mg tablet Take 1 Tab by mouth every twelve (12) hours every twelve (12) hours. Qty: 180 Tab, Refills: 5      DULoxetine (CYMBALTA) 60 mg capsule Take 60 mg by mouth every morning. amLODIPine (NORVASC) 10 mg tablet Take 10 mg by mouth every morning. zolpidem (AMBIEN) 10 mg tablet Take  by mouth nightly. cycloSPORINE (RESTASIS) 0.05 % ophthalmic emulsion Administer 1 Drop to both eyes two (2) times a day. Cholestyramine-Aspartame (CHOLESTYRAMINE LIGHT) 4 gram powder Take 8 g by mouth every morning. ALPRAZolam (XANAX) 0.25 mg tablet Take 0.25 mg by mouth two (2) times daily as needed. nitroglycerin (NITROSTAT) 0.4 mg SL tablet 1 Tab by SubLINGual route every five (5) minutes as needed for Chest Pain. Qty: 1 Bottle, Refills: 6               Signed:  Campos Sewell NP  3/18/2017  8:07 AM    [facility] CARDIOLOGY     3/18/2017     8:57 PM    I have personally seen and examined Lisa Nunez with the physician extender listed above. .  I agree and confirm findings in history, physical exam, and assessment/plan as outlined above.        Elisha Fischer MD

## 2017-03-18 NOTE — PROGRESS NOTES
Palpated pulse is 40 bpm  Av paced And bigem PVCs noted- telemetry reading 80    Dr Jose Chambers aware

## 2017-03-20 ENCOUNTER — HOSPITAL ENCOUNTER (OUTPATIENT)
Dept: CARDIAC REHAB | Age: 82
End: 2017-03-20
Payer: MEDICARE

## 2017-03-22 ENCOUNTER — HOSPITAL ENCOUNTER (OUTPATIENT)
Dept: CARDIAC REHAB | Age: 82
End: 2017-03-22
Payer: MEDICARE

## 2017-03-27 ENCOUNTER — APPOINTMENT (OUTPATIENT)
Dept: CARDIAC REHAB | Age: 82
End: 2017-03-27
Payer: MEDICARE

## 2017-03-29 ENCOUNTER — HOSPITAL ENCOUNTER (OUTPATIENT)
Dept: CARDIAC REHAB | Age: 82
End: 2017-03-29
Payer: MEDICARE

## 2017-04-03 ENCOUNTER — HOSPITAL ENCOUNTER (OUTPATIENT)
Dept: CARDIAC REHAB | Age: 82
End: 2017-04-03
Payer: MEDICARE

## 2017-04-05 ENCOUNTER — HOSPITAL ENCOUNTER (OUTPATIENT)
Dept: CARDIAC REHAB | Age: 82
End: 2017-04-05
Payer: MEDICARE

## 2017-04-10 ENCOUNTER — APPOINTMENT (OUTPATIENT)
Dept: CARDIAC REHAB | Age: 82
End: 2017-04-10
Payer: MEDICARE

## 2017-04-12 ENCOUNTER — HOSPITAL ENCOUNTER (OUTPATIENT)
Dept: CARDIAC REHAB | Age: 82
End: 2017-04-12
Payer: MEDICARE

## 2017-04-17 ENCOUNTER — APPOINTMENT (OUTPATIENT)
Dept: CARDIAC REHAB | Age: 82
End: 2017-04-17
Payer: MEDICARE

## 2017-04-19 ENCOUNTER — HOSPITAL ENCOUNTER (OUTPATIENT)
Dept: CARDIAC REHAB | Age: 82
Discharge: HOME | End: 2017-04-19
Payer: MEDICARE

## 2017-04-24 ENCOUNTER — HOSPITAL ENCOUNTER (OUTPATIENT)
Dept: CARDIAC REHAB | Age: 82
Discharge: HOME | End: 2017-04-24
Payer: MEDICARE

## 2017-04-24 VITALS — BODY MASS INDEX: 23.7 KG/M2 | WEIGHT: 158.2 LBS

## 2017-04-24 PROCEDURE — 93798 PHYS/QHP OP CAR RHAB W/ECG: CPT

## 2017-04-26 ENCOUNTER — APPOINTMENT (OUTPATIENT)
Dept: CARDIAC REHAB | Age: 82
End: 2017-04-26
Payer: MEDICARE

## 2017-04-27 ENCOUNTER — APPOINTMENT (OUTPATIENT)
Dept: CARDIAC REHAB | Age: 82
End: 2017-04-27
Payer: MEDICARE

## 2017-05-01 ENCOUNTER — HOSPITAL ENCOUNTER (OUTPATIENT)
Dept: CARDIAC REHAB | Age: 82
Discharge: HOME | End: 2017-05-01
Payer: MEDICARE

## 2017-05-01 PROCEDURE — 93798 PHYS/QHP OP CAR RHAB W/ECG: CPT

## 2017-05-03 ENCOUNTER — APPOINTMENT (OUTPATIENT)
Dept: CARDIAC REHAB | Age: 82
End: 2017-05-03
Payer: MEDICARE

## 2017-05-03 ENCOUNTER — HOSPITAL ENCOUNTER (OUTPATIENT)
Dept: CARDIAC REHAB | Age: 82
Discharge: HOME | End: 2017-05-03
Payer: MEDICARE

## 2017-05-03 VITALS — BODY MASS INDEX: 23.49 KG/M2 | WEIGHT: 156.8 LBS

## 2017-05-03 PROCEDURE — 93798 PHYS/QHP OP CAR RHAB W/ECG: CPT

## 2017-05-08 ENCOUNTER — HOSPITAL ENCOUNTER (OUTPATIENT)
Dept: CARDIAC REHAB | Age: 82
Discharge: HOME | End: 2017-05-08
Payer: MEDICARE

## 2017-05-08 PROCEDURE — 93798 PHYS/QHP OP CAR RHAB W/ECG: CPT

## 2017-05-08 NOTE — PROGRESS NOTES
Client History:  Current Medical Diagnosis: see ITP  Pertinent Medications: see review PTA meds      Have you gained or lost any weight in the past 3 months: lost 15 lbs     What do you attribute it to: event  What is your weight goal: see cardiac ITP    Have you made any changes in your eating habits since your heart problems began: no    Describe your appetite: fair    Supplements/Vitamins/Herbs: occuvite, vitamin D3, bionutrient MVI, women's energy pill, CoQ10, cholestyramine, omega 3    Food allergies: lactose intolerant    Problems with digestion, N/V/C/D: irregular BM; constipation for a few days and then diarrhea. Alcohol use: see cardiac ITP      Dietary recall:  Breakfast is 2 Vietnamese and Boost or cereal, dried fruit and lactaid milk (skim or 2%). Lunch is a few chocolate cookies and unsweetened tea  Dinner is a frozen meal or salads with spinach and vegetables and walnuts. May eat an apple with peanut butter or a pear. Pt drinks 8 oz unsweetened tea and 16 oz regular caffeine free Gingerale and 24 oz water/day          Anthropometric Data: see cardiac ITP  Ht:   Wt:   Age:  BMI:  Waist measurement:     Estimated Nutritional Needs:  Calories: 22 kcal/kg AZR=5408 kcals  Protein: 1gm/kg IBW=64gm   CHO: n/a  Fluids: 1ml/kcal      Nutrition Diagnosis: Food and nutrition knowledge deficit related to therapeutic diet  as evidenced by diet high in simple sugars and low in fiber and high in saturated fats. Nutrition Intervention:  Reviewed lab/body comp results/goals. Reviewed rate your plate and heart healthy choices. Reviewed label reading especially for frozen dinner meals and cheeses and encouraged trying [place] 50% less fat Cheddar cheese. Reviewed that increased fiber and fluids may help with regular BM. Encouraged skim Lactaid milk as the heart healthiest choice.  Discussed dietary means to lower triglyceride level and phytosterols and foods that have them added in that may help lower LDL cholesterol. Encouraged limiting saturated fats and trans fats to help lower LDL chol as well. Feel adherence will be poor due to pt stating she was not going to make many dietary changes at her age. Handouts: lab/body comp, heart healthy grocery guide, handout with foods that added phytosterols. Goals: See cardiac ITP         Monitoring/Evaluation: Follow-up appointment: RD to follow up with participant during cardiac rehab sessions for questions and assessment of progression toward goals.     Rosanna Francisco, AARTI, LD, CDE  Phone: [phone]

## 2017-05-10 ENCOUNTER — HOSPITAL ENCOUNTER (OUTPATIENT)
Dept: CARDIAC REHAB | Age: 82
Discharge: HOME | End: 2017-05-10
Payer: MEDICARE

## 2017-05-10 ENCOUNTER — APPOINTMENT (OUTPATIENT)
Dept: CARDIAC REHAB | Age: 82
End: 2017-05-10
Payer: MEDICARE

## 2017-05-10 VITALS — BODY MASS INDEX: 24.12 KG/M2 | WEIGHT: 161 LBS

## 2017-05-10 PROCEDURE — 93798 PHYS/QHP OP CAR RHAB W/ECG: CPT

## 2017-05-15 ENCOUNTER — HOSPITAL ENCOUNTER (OUTPATIENT)
Dept: CARDIAC REHAB | Age: 82
Discharge: HOME | End: 2017-05-15
Payer: MEDICARE

## 2017-05-15 PROCEDURE — 93798 PHYS/QHP OP CAR RHAB W/ECG: CPT

## 2017-05-17 ENCOUNTER — HOSPITAL ENCOUNTER (OUTPATIENT)
Dept: CARDIAC REHAB | Age: 82
Discharge: HOME | End: 2017-05-17
Payer: MEDICARE

## 2017-05-17 VITALS — BODY MASS INDEX: 23.94 KG/M2 | WEIGHT: 159.8 LBS

## 2017-05-17 PROCEDURE — 93798 PHYS/QHP OP CAR RHAB W/ECG: CPT

## 2017-05-22 ENCOUNTER — APPOINTMENT (OUTPATIENT)
Dept: CARDIAC REHAB | Age: 82
End: 2017-05-22
Payer: MEDICARE

## 2017-05-24 ENCOUNTER — HOSPITAL ENCOUNTER (OUTPATIENT)
Dept: CARDIAC REHAB | Age: 82
Discharge: HOME | End: 2017-05-24
Payer: MEDICARE

## 2017-05-24 ENCOUNTER — APPOINTMENT (OUTPATIENT)
Dept: CARDIAC REHAB | Age: 82
End: 2017-05-24
Payer: MEDICARE

## 2017-05-24 VITALS — WEIGHT: 159.2 LBS | BODY MASS INDEX: 23.85 KG/M2

## 2017-05-24 PROCEDURE — 93798 PHYS/QHP OP CAR RHAB W/ECG: CPT

## 2017-05-31 ENCOUNTER — HOSPITAL ENCOUNTER (OUTPATIENT)
Dept: CARDIAC REHAB | Age: 82
Discharge: HOME | End: 2017-05-31
Payer: MEDICARE

## 2017-05-31 VITALS — WEIGHT: 159.2 LBS | BODY MASS INDEX: 23.85 KG/M2

## 2017-05-31 PROCEDURE — 93798 PHYS/QHP OP CAR RHAB W/ECG: CPT

## 2017-06-05 ENCOUNTER — HOSPITAL ENCOUNTER (OUTPATIENT)
Dept: CARDIAC REHAB | Age: 82
Discharge: HOME | End: 2017-06-05
Payer: MEDICARE

## 2017-06-05 PROCEDURE — 93798 PHYS/QHP OP CAR RHAB W/ECG: CPT

## 2017-06-07 ENCOUNTER — HOSPITAL ENCOUNTER (OUTPATIENT)
Dept: CARDIAC REHAB | Age: 82
Discharge: HOME | End: 2017-06-07
Payer: MEDICARE

## 2017-06-07 PROCEDURE — 93798 PHYS/QHP OP CAR RHAB W/ECG: CPT

## 2017-06-12 ENCOUNTER — HOSPITAL ENCOUNTER (OUTPATIENT)
Dept: CARDIAC REHAB | Age: 82
Discharge: HOME | End: 2017-06-12
Payer: MEDICARE

## 2017-06-12 PROCEDURE — 93798 PHYS/QHP OP CAR RHAB W/ECG: CPT

## 2017-06-14 ENCOUNTER — HOSPITAL ENCOUNTER (OUTPATIENT)
Dept: CARDIAC REHAB | Age: 82
Discharge: HOME | End: 2017-06-14
Payer: MEDICARE

## 2017-06-14 PROCEDURE — 93798 PHYS/QHP OP CAR RHAB W/ECG: CPT

## 2017-06-19 ENCOUNTER — HOSPITAL ENCOUNTER (OUTPATIENT)
Dept: CARDIAC REHAB | Age: 82
Discharge: HOME | End: 2017-06-19
Payer: MEDICARE

## 2017-06-19 PROCEDURE — 93798 PHYS/QHP OP CAR RHAB W/ECG: CPT

## 2017-06-21 ENCOUNTER — HOSPITAL ENCOUNTER (OUTPATIENT)
Dept: CARDIAC REHAB | Age: 82
End: 2017-06-21
Payer: MEDICARE

## 2017-06-23 ENCOUNTER — HOSPITAL ENCOUNTER (OUTPATIENT)
Dept: CARDIAC REHAB | Age: 82
Discharge: HOME | End: 2017-06-23
Payer: MEDICARE

## 2017-06-23 PROCEDURE — 93798 PHYS/QHP OP CAR RHAB W/ECG: CPT

## 2017-06-26 ENCOUNTER — HOSPITAL ENCOUNTER (OUTPATIENT)
Dept: CARDIAC REHAB | Age: 82
Discharge: HOME | End: 2017-06-26
Payer: MEDICARE

## 2017-06-26 PROCEDURE — 93798 PHYS/QHP OP CAR RHAB W/ECG: CPT

## 2017-06-28 ENCOUNTER — HOSPITAL ENCOUNTER (OUTPATIENT)
Dept: CARDIAC REHAB | Age: 82
Discharge: HOME | End: 2017-06-28
Payer: MEDICARE

## 2017-06-28 ENCOUNTER — HOSPITAL ENCOUNTER (OUTPATIENT)
Dept: LAB | Age: 82
Discharge: HOME | End: 2017-06-28
Attending: INTERNAL MEDICINE
Payer: MEDICARE

## 2017-06-28 VITALS — BODY MASS INDEX: 23.97 KG/M2 | WEIGHT: 160 LBS

## 2017-06-28 DIAGNOSIS — I50.22 CHRONIC SYSTOLIC CHF (CONGESTIVE HEART FAILURE) (HCC): ICD-10-CM

## 2017-06-28 LAB
ANION GAP BLD CALC-SCNC: 11 MMOL/L
BUN SERPL-MCNC: 17 MG/DL (ref 8–23)
CALCIUM SERPL-MCNC: 9.5 MG/DL (ref 8.3–10.4)
CHLORIDE SERPL-SCNC: 106 MMOL/L (ref 98–107)
CO2 SERPL-SCNC: 24 MMOL/L (ref 21–32)
CREAT SERPL-MCNC: 1.2 MG/DL (ref 0.6–1)
GLUCOSE SERPL-MCNC: 126 MG/DL (ref 65–100)
POTASSIUM SERPL-SCNC: 3.9 MMOL/L (ref 3.5–5.1)
SODIUM SERPL-SCNC: 141 MMOL/L (ref 136–145)

## 2017-06-28 PROCEDURE — 80048 BASIC METABOLIC PNL TOTAL CA: CPT | Performed by: INTERNAL MEDICINE

## 2017-06-28 PROCEDURE — 36415 COLL VENOUS BLD VENIPUNCTURE: CPT | Performed by: INTERNAL MEDICINE

## 2017-06-28 PROCEDURE — 93798 PHYS/QHP OP CAR RHAB W/ECG: CPT

## 2017-07-03 ENCOUNTER — HOSPITAL ENCOUNTER (OUTPATIENT)
Dept: CARDIAC REHAB | Age: 82
Discharge: HOME | End: 2017-07-03
Payer: MEDICARE

## 2017-07-03 PROCEDURE — 93798 PHYS/QHP OP CAR RHAB W/ECG: CPT

## 2017-07-05 ENCOUNTER — HOSPITAL ENCOUNTER (OUTPATIENT)
Dept: CARDIAC REHAB | Age: 82
Discharge: HOME | End: 2017-07-05
Payer: MEDICARE

## 2017-07-05 VITALS — WEIGHT: 161.6 LBS | BODY MASS INDEX: 24.21 KG/M2

## 2017-07-05 PROCEDURE — 93798 PHYS/QHP OP CAR RHAB W/ECG: CPT

## 2017-07-10 ENCOUNTER — HOSPITAL ENCOUNTER (OUTPATIENT)
Dept: LAB | Age: 82
Discharge: HOME | End: 2017-07-10
Attending: INTERNAL MEDICINE
Payer: MEDICARE

## 2017-07-10 ENCOUNTER — HOSPITAL ENCOUNTER (OUTPATIENT)
Dept: CARDIAC REHAB | Age: 82
Discharge: HOME | End: 2017-07-10
Payer: MEDICARE

## 2017-07-10 DIAGNOSIS — I50.9 CHRONIC CONGESTIVE HEART FAILURE, UNSPECIFIED CONGESTIVE HEART FAILURE TYPE: ICD-10-CM

## 2017-07-10 LAB
BNP SERPL-MCNC: 187 PG/ML
HCT VFR BLD AUTO: 42.3 % (ref 35.8–46.3)

## 2017-07-10 PROCEDURE — 83880 ASSAY OF NATRIURETIC PEPTIDE: CPT | Performed by: INTERNAL MEDICINE

## 2017-07-10 PROCEDURE — 85014 HEMATOCRIT: CPT | Performed by: INTERNAL MEDICINE

## 2017-07-10 PROCEDURE — 36415 COLL VENOUS BLD VENIPUNCTURE: CPT | Performed by: INTERNAL MEDICINE

## 2017-07-10 PROCEDURE — 93798 PHYS/QHP OP CAR RHAB W/ECG: CPT

## 2017-07-12 ENCOUNTER — HOSPITAL ENCOUNTER (OUTPATIENT)
Dept: CARDIAC REHAB | Age: 82
Discharge: HOME | End: 2017-07-12
Payer: MEDICARE

## 2017-07-12 ENCOUNTER — APPOINTMENT (OUTPATIENT)
Dept: CARDIAC REHAB | Age: 82
End: 2017-07-12
Payer: MEDICARE

## 2017-07-12 VITALS — WEIGHT: 164.8 LBS | BODY MASS INDEX: 24.69 KG/M2

## 2017-07-12 PROCEDURE — 93798 PHYS/QHP OP CAR RHAB W/ECG: CPT

## 2017-07-17 ENCOUNTER — HOSPITAL ENCOUNTER (OUTPATIENT)
Dept: CARDIAC REHAB | Age: 82
Discharge: HOME | End: 2017-07-17
Payer: MEDICARE

## 2017-07-17 ENCOUNTER — APPOINTMENT (OUTPATIENT)
Dept: CARDIAC REHAB | Age: 82
End: 2017-07-17
Payer: MEDICARE

## 2017-07-17 PROCEDURE — 93798 PHYS/QHP OP CAR RHAB W/ECG: CPT

## 2017-07-19 ENCOUNTER — APPOINTMENT (OUTPATIENT)
Dept: CARDIAC REHAB | Age: 82
End: 2017-07-19
Payer: MEDICARE

## 2017-07-24 ENCOUNTER — APPOINTMENT (OUTPATIENT)
Dept: CARDIAC REHAB | Age: 82
End: 2017-07-24
Payer: MEDICARE

## 2017-07-26 ENCOUNTER — APPOINTMENT (OUTPATIENT)
Dept: CARDIAC REHAB | Age: 82
End: 2017-07-26
Payer: MEDICARE

## 2017-08-13 ENCOUNTER — APPOINTMENT (OUTPATIENT)
Dept: GENERAL RADIOLOGY | Age: 82
End: 2017-08-13
Attending: EMERGENCY MEDICINE
Payer: MEDICARE

## 2017-08-13 ENCOUNTER — HOSPITAL ENCOUNTER (EMERGENCY)
Age: 82
Discharge: HOME | End: 2017-08-13
Attending: EMERGENCY MEDICINE
Payer: MEDICARE

## 2017-08-13 VITALS
HEIGHT: 68 IN | SYSTOLIC BLOOD PRESSURE: 158 MMHG | TEMPERATURE: 98.5 F | BODY MASS INDEX: 24.25 KG/M2 | WEIGHT: 160 LBS | HEART RATE: 79 BPM | RESPIRATION RATE: 18 BRPM | DIASTOLIC BLOOD PRESSURE: 85 MMHG | OXYGEN SATURATION: 98 %

## 2017-08-13 DIAGNOSIS — R19.7 DIARRHEA, UNSPECIFIED TYPE: ICD-10-CM

## 2017-08-13 DIAGNOSIS — R10.13 ACUTE EPIGASTRIC PAIN: Primary | ICD-10-CM

## 2017-08-13 DIAGNOSIS — R07.89 ATYPICAL CHEST PAIN: ICD-10-CM

## 2017-08-13 DIAGNOSIS — R11.0 NAUSEA ALONE: ICD-10-CM

## 2017-08-13 LAB
ALBUMIN SERPL BCP-MCNC: 3.9 G/DL (ref 3.2–4.6)
ALBUMIN/GLOB SERPL: 1.1 {RATIO} (ref 1.2–3.5)
ALP SERPL-CCNC: 58 U/L (ref 50–136)
ALT SERPL-CCNC: 19 U/L (ref 12–65)
ANION GAP BLD CALC-SCNC: 11 MMOL/L (ref 7–16)
AST SERPL W P-5'-P-CCNC: 32 U/L (ref 15–37)
BASOPHILS # BLD AUTO: 0 K/UL (ref 0–0.2)
BASOPHILS # BLD: 0 % (ref 0–2)
BILIRUB SERPL-MCNC: 0.8 MG/DL (ref 0.2–1.1)
BNP SERPL-MCNC: 44 PG/ML
BUN SERPL-MCNC: 10 MG/DL (ref 8–23)
CALCIUM SERPL-MCNC: 9.5 MG/DL (ref 8.3–10.4)
CHLORIDE SERPL-SCNC: 98 MMOL/L (ref 98–107)
CO2 SERPL-SCNC: 26 MMOL/L (ref 21–32)
CREAT SERPL-MCNC: 1.12 MG/DL (ref 0.6–1)
DIFFERENTIAL METHOD BLD: ABNORMAL
EOSINOPHIL # BLD: 0.1 K/UL (ref 0–0.8)
EOSINOPHIL NFR BLD: 2 % (ref 0.5–7.8)
ERYTHROCYTE [DISTWIDTH] IN BLOOD BY AUTOMATED COUNT: 14 % (ref 11.9–14.6)
GLOBULIN SER CALC-MCNC: 3.6 G/DL (ref 2.3–3.5)
GLUCOSE SERPL-MCNC: 122 MG/DL (ref 65–100)
HCT VFR BLD AUTO: 44.4 % (ref 35.8–46.3)
HGB BLD-MCNC: 14.7 G/DL (ref 11.7–15.4)
IMM GRANULOCYTES # BLD: 0 K/UL (ref 0–0.5)
IMM GRANULOCYTES NFR BLD AUTO: 0.6 % (ref 0–5)
LIPASE SERPL-CCNC: 139 U/L (ref 73–393)
LYMPHOCYTES # BLD AUTO: 19 % (ref 13–44)
LYMPHOCYTES # BLD: 1.4 K/UL (ref 0.5–4.6)
MCH RBC QN AUTO: 29.3 PG (ref 26.1–32.9)
MCHC RBC AUTO-ENTMCNC: 33.1 G/DL (ref 31.4–35)
MCV RBC AUTO: 88.4 FL (ref 79.6–97.8)
MONOCYTES # BLD: 0.8 K/UL (ref 0.1–1.3)
MONOCYTES NFR BLD AUTO: 11 % (ref 4–12)
NEUTS SEG # BLD: 4.8 K/UL (ref 1.7–8.2)
NEUTS SEG NFR BLD AUTO: 67 % (ref 43–78)
PLATELET # BLD AUTO: 251 K/UL (ref 150–450)
PMV BLD AUTO: 9.9 FL (ref 10.8–14.1)
POTASSIUM SERPL-SCNC: 4 MMOL/L (ref 3.5–5.1)
PROT SERPL-MCNC: 7.5 G/DL (ref 6.3–8.2)
RBC # BLD AUTO: 5.02 M/UL (ref 4.05–5.25)
SODIUM SERPL-SCNC: 135 MMOL/L (ref 136–145)
TROPONIN I BLD-MCNC: 0.01 NG/ML (ref 0–0.08)
WBC # BLD AUTO: 7.1 K/UL (ref 4.3–11.1)

## 2017-08-13 PROCEDURE — 83880 ASSAY OF NATRIURETIC PEPTIDE: CPT | Performed by: EMERGENCY MEDICINE

## 2017-08-13 PROCEDURE — 81003 URINALYSIS AUTO W/O SCOPE: CPT | Performed by: EMERGENCY MEDICINE

## 2017-08-13 PROCEDURE — 85025 COMPLETE CBC W/AUTO DIFF WBC: CPT | Performed by: EMERGENCY MEDICINE

## 2017-08-13 PROCEDURE — 84484 ASSAY OF TROPONIN QUANT: CPT

## 2017-08-13 PROCEDURE — 80053 COMPREHEN METABOLIC PANEL: CPT | Performed by: EMERGENCY MEDICINE

## 2017-08-13 PROCEDURE — 83690 ASSAY OF LIPASE: CPT | Performed by: EMERGENCY MEDICINE

## 2017-08-13 PROCEDURE — 93005 ELECTROCARDIOGRAM TRACING: CPT | Performed by: EMERGENCY MEDICINE

## 2017-08-13 PROCEDURE — 71010 XR CHEST PORT: CPT

## 2017-08-13 PROCEDURE — 99284 EMERGENCY DEPT VISIT MOD MDM: CPT | Performed by: EMERGENCY MEDICINE

## 2017-08-13 RX ORDER — SUCRALFATE 1 G/1
1 TABLET ORAL 4 TIMES DAILY
Qty: 40 TAB | Refills: 0 | Status: SHIPPED | OUTPATIENT
Start: 2017-08-13 | End: 2018-07-02 | Stop reason: ALTCHOICE

## 2017-08-13 RX ORDER — OMEPRAZOLE 20 MG/1
20 CAPSULE, DELAYED RELEASE ORAL DAILY
Qty: 30 CAP | Refills: 0 | Status: SHIPPED | OUTPATIENT
Start: 2017-08-13 | End: 2017-12-12

## 2017-08-13 NOTE — ED PROVIDER NOTES
HPI Comments: 27-year-old female states some nausea and some epigastric burning and some diarrhea on and off for the past week. She's had some fatigue and chills. She's had minimal cough. Over the last 24 hours she's had substernal chest pressure that would come and go. These would last hours at a time. Not really exertional and worse when supine. Patient has had no fever no abnormal bleeding no edema or shortness of breath. No pain in neck back or arms. Past history; artery disease and congestive heart failure. She has an AICD in place. Also a history of esophageal problems with hiatal hernia, esophageal stricture and status post Nissen fundoplication. Main concern was the substernal pressure. Diarrhea has resolved. Patient is a [de-identified] y.o. female presenting with nausea. The history is provided by the patient. Nausea    This is a new problem. The current episode started more than 2 days ago. The problem has been gradually improving. There has been no fever. Associated symptoms include diarrhea and cough. Pertinent negatives include no chills, no fever, no abdominal pain, no headaches and no headaches. Her pertinent negatives include no DM.         Past Medical History:   Diagnosis Date    Anemia     no recent episodes    Anxiety     managed with medication     Arthritis     generalized     Bundle branch block, left     managed with medication     CAD (coronary artery disease) 2004    x 1 stent     Chicken pox     Chronic systolic CHF (congestive heart failure) ([facility] 3/13/2017    Claustrophobia     Depression     managed with medication     Diverticulitis     resolved    Elevated cholesterol     managed with medication     H/O: whooping cough     Hiatal hernia     with bleeding, surgically repaired     Hypertension     managed with medication     Measles     Mumps     NSAID sensitivity     Post-menopausal     PUD (peptic ulcer disease)     bleeding ulcers, cannot take NSAIDS     Pyloric ulcer associated with Helicobacter pylori 7437       Past Surgical History:   Procedure Laterality Date    HX BREAST BIOPSY Left     Benign cyst    HX CATARACT REMOVAL Bilateral 2011    HX HEART CATHETERIZATION      STENT X 2    HX HERNIA REPAIR  2011    Hiatal     HX HIP REPLACEMENT Right     HX KNEE ARTHROSCOPY Right 2007    right    NEUROLOGICAL PROCEDURE UNLISTED      back surgery    CA LEFT HEART CATH,PERCUTANEOUS  2010/2004    PCI to LAD x1          Family History:   Problem Relation Age of Onset    Hypertension Father     Breast Cancer Mother 55    Cancer Mother     Colon Cancer Neg Hx     Ovarian Cancer Neg Hx        Social History     Social History    Marital status: SINGLE     Spouse name: N/A    Number of children: N/A    Years of education: N/A     Occupational History    Not on file. Social History Main Topics    Smoking status: Never Smoker    Smokeless tobacco: Never Used    Alcohol use Yes      Comment: occassional    Drug use: No    Sexual activity: Not Currently     Other Topics Concern    Not on file     Social History Narrative         ALLERGIES: Latex; Lactose; Adhesive; Atorvastatin; Augmentin [amoxicillin-pot clavulanate]; Codeine; Ezetimibe; Fluoxetine; Percocet [oxycodone-acetaminophen]; Potassium; Pravastatin; Prednisone; Simvastatin; and Wellbutrin [bupropion]    Review of Systems   Constitutional: Negative for chills and fever. Respiratory: Positive for cough. Negative for shortness of breath. Cardiovascular: Negative for chest pain, palpitations and leg swelling. Gastrointestinal: Positive for diarrhea and nausea. Negative for abdominal pain and vomiting. Genitourinary: Negative for dysuria and flank pain. Musculoskeletal: Negative for back pain and neck pain. Skin: Negative for color change and rash. Neurological: Negative for syncope and headaches. All other systems reviewed and are negative.       Vitals:    08/13/17 1346   BP: (!) 163/92   Pulse: 80   Resp: 16   Temp: 98.1 °F (36.7 °C)   SpO2: 96%   Weight: 72.6 kg (160 lb)   Height: 5' 8\" (1.727 m)            Physical Exam   Constitutional: She is oriented to person, place, and time. She appears well-developed and well-nourished. No distress. HENT:   Head: Normocephalic and atraumatic. Mouth/Throat: Oropharynx is clear and moist. No oropharyngeal exudate. Eyes: Conjunctivae and EOM are normal. Pupils are equal, round, and reactive to light. Neck: Normal range of motion. Neck supple. Cardiovascular: Normal rate, regular rhythm and intact distal pulses. No murmur heard. Pulmonary/Chest: Breath sounds normal. No respiratory distress. Abdominal: Soft. Bowel sounds are normal. She exhibits no mass. There is tenderness in the epigastric area. There is no rebound, no guarding, no tenderness at McBurney's point and negative Torres's sign. No hernia. Neurological: She is alert and oriented to person, place, and time. Gait normal.   Nl speech   Skin: Skin is warm and dry. Psychiatric: She has a normal mood and affect. Her speech is normal.   Nursing note and vitals reviewed. MDM  Number of Diagnoses or Management Options  Diagnosis management comments: Check EKG and troponin. Symptoms been for the majority of the last 24 hours probably 1 troponin will be appropriate. Check for dehydration, bleeding, bacterial infection. Check chest x-ray.        Amount and/or Complexity of Data Reviewed  Clinical lab tests: ordered and reviewed  Tests in the radiology section of CPT®: ordered and reviewed  Tests in the medicine section of CPT®: ordered and reviewed  Discuss the patient with other providers: yes  Independent visualization of images, tracings, or specimens: yes    Risk of Complications, Morbidity, and/or Mortality  Presenting problems: moderate  Diagnostic procedures: low  Management options: moderate    Patient Progress  Patient progress: stable    ED Course Procedures      Results Include:    Recent Results (from the past 24 hour(s))   CBC WITH AUTOMATED DIFF    Collection Time: 08/13/17  1:50 PM   Result Value Ref Range    WBC 7.1 4.3 - 11.1 K/uL    RBC 5.02 4.05 - 5.25 M/uL    HGB 14.7 11.7 - 15.4 g/dL    HCT 44.4 35.8 - 46.3 %    MCV 88.4 79.6 - 97.8 FL    MCH 29.3 26.1 - 32.9 PG    MCHC 33.1 31.4 - 35.0 g/dL    RDW 14.0 11.9 - 14.6 %    PLATELET 423 746 - 485 K/uL    MPV 9.9 (L) 10.8 - 14.1 FL    DF AUTOMATED      NEUTROPHILS 67 43 - 78 %    LYMPHOCYTES 19 13 - 44 %    MONOCYTES 11 4.0 - 12.0 %    EOSINOPHILS 2 0.5 - 7.8 %    BASOPHILS 0 0.0 - 2.0 %    IMMATURE GRANULOCYTES 0.6 0.0 - 5.0 %    ABS. NEUTROPHILS 4.8 1.7 - 8.2 K/UL    ABS. LYMPHOCYTES 1.4 0.5 - 4.6 K/UL    ABS. MONOCYTES 0.8 0.1 - 1.3 K/UL    ABS. EOSINOPHILS 0.1 0.0 - 0.8 K/UL    ABS. BASOPHILS 0.0 0.0 - 0.2 K/UL    ABS. IMM. GRANS. 0.0 0.0 - 0.5 K/UL   METABOLIC PANEL, COMPREHENSIVE    Collection Time: 08/13/17  1:50 PM   Result Value Ref Range    Sodium 135 (L) 136 - 145 mmol/L    Potassium 4.0 3.5 - 5.1 mmol/L    Chloride 98 98 - 107 mmol/L    CO2 26 21 - 32 mmol/L    Anion gap 11 7 - 16 mmol/L    Glucose 122 (H) 65 - 100 mg/dL    BUN 10 8 - 23 MG/DL    Creatinine 1.12 (H) 0.6 - 1.0 MG/DL    GFR est AA >60 >60 ml/min/1.73m2    GFR est non-AA 50 (L) >60 ml/min/1.73m2    Calcium 9.5 8.3 - 10.4 MG/DL    Bilirubin, total 0.8 0.2 - 1.1 MG/DL    ALT (SGPT) 19 12 - 65 U/L    AST (SGOT) 32 15 - 37 U/L    Alk.  phosphatase 58 50 - 136 U/L    Protein, total 7.5 6.3 - 8.2 g/dL    Albumin 3.9 3.2 - 4.6 g/dL    Globulin 3.6 (H) 2.3 - 3.5 g/dL    A-G Ratio 1.1 (L) 1.2 - 3.5     POC TROPONIN-I    Collection Time: 08/13/17  2:02 PM   Result Value Ref Range    Troponin-I (POC) 0.01 0.0 - 0.08 ng/ml   EKG, 12 LEAD, INITIAL    Collection Time: 08/13/17  2:13 PM   Result Value Ref Range    Ventricular Rate 80 BPM    Atrial Rate 80 BPM    P-R Interval 152 ms    QRS Duration 146 ms    Q-T Interval 448 ms    QTC Calculation (Bezet) 516 ms    Calculated R Axis -93 degrees    Calculated T Axis 80 degrees    Diagnosis       !!! Suspect arm lead reversal, interpretation assumes no reversal  Atrial-paced rhythm  Non-specific intra-ventricular conduction block  Right ventricular hypertrophy  Cannot rule out Septal infarct , age undetermined  Possible Lateral infarct , age undetermined  Abnormal ECG  When compared with ECG of 17-MAR-2017 16:09,  Electronic atrial pacemaker has replaced Electronic ventricular pacemaker       Xr Chest Port    Result Date: 8/13/2017  PA Chest X-Ray 8/13/2017 2:29 PM INDICATION: Chest pressure, pain. Onset today. COMPARISON: 3/18/2017 FINDINGS: Upright PA chest view is submitted. Cardiomediastinal silhouette stable with the left-sided permanent pacemaker defibrillator in place. Heart is not significantly enlarged. There is normal vascularity. Left lung remains clear. There does appear to be increasing opacity though at the left base with blunting of the costophrenic angle. No pneumothorax. IMPRESSION: 1. Small to moderate-sized layering effusion suggested on the right. 2. Cardiac silhouette normal. No overt pulmonary edema. AICD unit remains in place. Symptoms are more suspicious for GI origin since it is supine and she's had recent GI difficulties. Has taken PPI in the past.  Will have patient follow-up with her internist and cardiologist.    EKG reveals paced rhythm without evidence for ectopy or ST-T changes.       Lipase normal

## 2017-08-13 NOTE — ED NOTES
Pt states diarrhea Friday and Saturday, and now has not gone today. Pt states there is pressure in the middle of her chest that feels better when she stands up. Pt appears to be in no distress, pt just states she wants a pill to make it better.

## 2017-08-13 NOTE — DISCHARGE INSTRUCTIONS
Rest.  Start acid medication. Call your doctor tomorrow to recheck. Also may call cardiology to recheck regarding the small amount of fluid surrounding her lung on the right side. They may repeat  Or your internist may repeat a chest x-ray in a few weeks. Results Include:    Recent Results (from the past 24 hour(s))   CBC WITH AUTOMATED DIFF    Collection Time: 08/13/17  1:50 PM   Result Value Ref Range    WBC 7.1 4.3 - 11.1 K/uL    RBC 5.02 4.05 - 5.25 M/uL    HGB 14.7 11.7 - 15.4 g/dL    HCT 44.4 35.8 - 46.3 %    MCV 88.4 79.6 - 97.8 FL    MCH 29.3 26.1 - 32.9 PG    MCHC 33.1 31.4 - 35.0 g/dL    RDW 14.0 11.9 - 14.6 %    PLATELET 934 510 - 312 K/uL    MPV 9.9 (L) 10.8 - 14.1 FL    DF AUTOMATED      NEUTROPHILS 67 43 - 78 %    LYMPHOCYTES 19 13 - 44 %    MONOCYTES 11 4.0 - 12.0 %    EOSINOPHILS 2 0.5 - 7.8 %    BASOPHILS 0 0.0 - 2.0 %    IMMATURE GRANULOCYTES 0.6 0.0 - 5.0 %    ABS. NEUTROPHILS 4.8 1.7 - 8.2 K/UL    ABS. LYMPHOCYTES 1.4 0.5 - 4.6 K/UL    ABS. MONOCYTES 0.8 0.1 - 1.3 K/UL    ABS. EOSINOPHILS 0.1 0.0 - 0.8 K/UL    ABS. BASOPHILS 0.0 0.0 - 0.2 K/UL    ABS. IMM. GRANS. 0.0 0.0 - 0.5 K/UL   METABOLIC PANEL, COMPREHENSIVE    Collection Time: 08/13/17  1:50 PM   Result Value Ref Range    Sodium 135 (L) 136 - 145 mmol/L    Potassium 4.0 3.5 - 5.1 mmol/L    Chloride 98 98 - 107 mmol/L    CO2 26 21 - 32 mmol/L    Anion gap 11 7 - 16 mmol/L    Glucose 122 (H) 65 - 100 mg/dL    BUN 10 8 - 23 MG/DL    Creatinine 1.12 (H) 0.6 - 1.0 MG/DL    GFR est AA >60 >60 ml/min/1.73m2    GFR est non-AA 50 (L) >60 ml/min/1.73m2    Calcium 9.5 8.3 - 10.4 MG/DL    Bilirubin, total 0.8 0.2 - 1.1 MG/DL    ALT (SGPT) 19 12 - 65 U/L    AST (SGOT) 32 15 - 37 U/L    Alk.  phosphatase 58 50 - 136 U/L    Protein, total 7.5 6.3 - 8.2 g/dL    Albumin 3.9 3.2 - 4.6 g/dL    Globulin 3.6 (H) 2.3 - 3.5 g/dL    A-G Ratio 1.1 (L) 1.2 - 3.5     LIPASE    Collection Time: 08/13/17  1:50 PM   Result Value Ref Range    Lipase 139 73 - 393 U/L   POC TROPONIN-I    Collection Time: 08/13/17  2:02 PM   Result Value Ref Range    Troponin-I (POC) 0.01 0.0 - 0.08 ng/ml   EKG, 12 LEAD, INITIAL    Collection Time: 08/13/17  2:13 PM   Result Value Ref Range    Ventricular Rate 80 BPM    Atrial Rate 80 BPM    P-R Interval 152 ms    QRS Duration 146 ms    Q-T Interval 448 ms    QTC Calculation (Bezet) 516 ms    Calculated R Axis -93 degrees    Calculated T Axis 80 degrees    Diagnosis       !!! Suspect arm lead reversal, interpretation assumes no reversal  Atrial-paced rhythm  Non-specific intra-ventricular conduction block  Right ventricular hypertrophy  Cannot rule out Septal infarct , age undetermined  Possible Lateral infarct , age undetermined  Abnormal ECG  When compared with ECG of 17-MAR-2017 16:09,  Electronic atrial pacemaker has replaced Electronic ventricular pacemaker                  Chest Pain: Care Instructions  Your Care Instructions  There are many things that can cause chest pain. Some are not serious and will get better on their own in a few days. But some kinds of chest pain need more testing and treatment. Your doctor may have recommended a follow-up visit in the next 8 to 12 hours. If you are not getting better, you may need more tests or treatment. Even though your doctor has released you, you still need to watch for any problems. The doctor carefully checked you, but sometimes problems can develop later. If you have new symptoms or if your symptoms do not get better, get medical care right away. If you have worse or different chest pain or pressure that lasts more than 5 minutes or you passed out (lost consciousness), call 911 or seek other emergency help right away. A medical visit is only one step in your treatment. Even if you feel better, you still need to do what your doctor recommends, such as going to all suggested follow-up appointments and taking medicines exactly as directed.  This will help you recover and help prevent future problems. How can you care for yourself at home? · Rest until you feel better. · Take your medicine exactly as prescribed. Call your doctor if you think you are having a problem with your medicine. · Do not drive after taking a prescription pain medicine. When should you call for help? Call 911 if:  · You passed out (lost consciousness). · You have severe difficulty breathing. · You have symptoms of a heart attack. These may include:  ¨ Chest pain or pressure, or a strange feeling in your chest.  ¨ Sweating. ¨ Shortness of breath. ¨ Nausea or vomiting. ¨ Pain, pressure, or a strange feeling in your back, neck, jaw, or upper belly or in one or both shoulders or arms. ¨ Lightheadedness or sudden weakness. ¨ A fast or irregular heartbeat. After you call 911, the  may tell you to chew 1 adult-strength or 2 to 4 low-dose aspirin. Wait for an ambulance. Do not try to drive yourself. Call your doctor today if:  · You have any trouble breathing. · Your chest pain gets worse. · You are dizzy or lightheaded, or you feel like you may faint. · You are not getting better as expected. · You are having new or different chest pain. Where can you learn more? Go to http://tammy-jesús.info/. Enter A120 in the search box to learn more about \"Chest Pain: Care Instructions. \"  Current as of: March 20, 2017  Content Version: 11.3  © 1220-8050 [employer]. Care instructions adapted under license by [employer] (which disclaims liability or warranty for this information). If you have questions about a medical condition or this instruction, always ask your healthcare professional. [place] [address] any warranty or liability for your use of this information. Gastroesophageal Reflux Disease (GERD): Care Instructions  Your Care Instructions    Gastroesophageal reflux disease (GERD) is the backward flow of stomach acid into the esophagus.  The esophagus is the tube that leads from your throat to your stomach. A one-way valve prevents the stomach acid from moving up into this tube. When you have GERD, this valve does not close tightly enough. If you have mild GERD symptoms including heartburn, you may be able to control the problem with antacids or over-the-counter medicine. Changing your diet, losing weight, and making other lifestyle changes can also help reduce symptoms. Follow-up care is a key part of your treatment and safety. Be sure to make and go to all appointments, and call your doctor if you are having problems. Its also a good idea to know your test results and keep a list of the medicines you take. How can you care for yourself at home? · Take your medicines exactly as prescribed. Call your doctor if you think you are having a problem with your medicine. · Your doctor may recommend over-the-counter medicine. For mild or occasional indigestion, antacids, such as Tums, Gaviscon, Mylanta, or Maalox, may help. Your doctor also may recommend over-the-counter acid reducers, such as Pepcid AC, Tagamet HB, Zantac 75, or Prilosec. Read and follow all instructions on the label. If you use these medicines often, talk with your doctor. · Change your eating habits. ¨ Its best to eat several small meals instead of two or three large meals. ¨ After you eat, wait 2 to 3 hours before you lie down. ¨ Chocolate, mint, and alcohol can make GERD worse. ¨ Spicy foods, foods that have a lot of acid (like tomatoes and oranges), and coffee can make GERD symptoms worse in some people. If your symptoms are worse after you eat a certain food, you may want to stop eating that food to see if your symptoms get better. · Do not smoke or chew tobacco. Smoking can make GERD worse. If you need help quitting, talk to your doctor about stop-smoking programs and medicines. These can increase your chances of quitting for good.   · If you have GERD symptoms at night, raise the head of your bed 6 to 8 inches by putting the frame on blocks or placing a foam wedge under the head of your mattress. (Adding extra pillows does not work.)  · Do not wear tight clothing around your middle. · Lose weight if you need to. Losing just 5 to 10 pounds can help. When should you call for help? Call your doctor now or seek immediate medical care if:  · You have new or different belly pain. · Your stools are black and tarlike or have streaks of blood. Watch closely for changes in your health, and be sure to contact your doctor if:  · Your symptoms have not improved after 2 days. · Food seems to catch in your throat or chest.  Where can you learn more? Go to http://tammy-jesús.info/. Enter B409 in the search box to learn more about \"Gastroesophageal Reflux Disease (GERD): Care Instructions. \"  Current as of: August 9, 2016  Content Version: 11.3  © 5800-1883 [employer]. Care instructions adapted under license by [employer] (which disclaims liability or warranty for this information). If you have questions about a medical condition or this instruction, always ask your healthcare professional. [address] any warranty or liability for your use of this information.

## 2017-08-14 LAB
ATRIAL RATE: 80 BPM
CALCULATED R AXIS, ECG10: -93 DEGREES
CALCULATED T AXIS, ECG11: 80 DEGREES
DIAGNOSIS, 93000: NORMAL
P-R INTERVAL, ECG05: 152 MS
Q-T INTERVAL, ECG07: 448 MS
QRS DURATION, ECG06: 146 MS
QTC CALCULATION (BEZET), ECG08: 516 MS
VENTRICULAR RATE, ECG03: 80 BPM

## 2017-08-22 PROBLEM — J90 PLEURAL EFFUSION ON RIGHT: Status: ACTIVE | Noted: 2017-08-22

## 2017-12-04 ENCOUNTER — HOSPITAL ENCOUNTER (OUTPATIENT)
Dept: LAB | Age: 82
Discharge: HOME | End: 2017-12-04

## 2017-12-04 PROCEDURE — 88305 TISSUE EXAM BY PATHOLOGIST: CPT | Performed by: INTERNAL MEDICINE

## 2017-12-04 PROCEDURE — 88312 SPECIAL STAINS GROUP 1: CPT | Performed by: INTERNAL MEDICINE

## 2017-12-29 ENCOUNTER — HOSPITAL ENCOUNTER (OUTPATIENT)
Dept: CT IMAGING | Age: 82
Discharge: HOME | End: 2017-12-29
Attending: INTERNAL MEDICINE
Payer: MEDICARE

## 2017-12-29 DIAGNOSIS — R63.4 WEIGHT LOSS: ICD-10-CM

## 2017-12-29 DIAGNOSIS — Z87.19 HISTORY OF DIVERTICULOSIS: ICD-10-CM

## 2017-12-29 DIAGNOSIS — R19.4 CHANGE IN BOWEL HABITS: ICD-10-CM

## 2017-12-29 DIAGNOSIS — K59.04 CHRONIC IDIOPATHIC CONSTIPATION: ICD-10-CM

## 2017-12-29 DIAGNOSIS — R10.30 LOWER ABDOMINAL PAIN: ICD-10-CM

## 2017-12-29 LAB — CREAT BLD-MCNC: 1.2 MG/DL (ref 0.8–1.5)

## 2017-12-29 PROCEDURE — 74011000258 HC RX REV CODE- 258: Performed by: INTERNAL MEDICINE

## 2017-12-29 PROCEDURE — 82565 ASSAY OF CREATININE: CPT

## 2017-12-29 PROCEDURE — 74011636320 HC RX REV CODE- 636/320: Performed by: INTERNAL MEDICINE

## 2017-12-29 PROCEDURE — 74177 CT ABD & PELVIS W/CONTRAST: CPT

## 2017-12-29 RX ORDER — SODIUM CHLORIDE 0.9 % (FLUSH) 0.9 %
10 SYRINGE (ML) INJECTION
Status: COMPLETED | OUTPATIENT
Start: 2017-12-29 | End: 2017-12-29

## 2017-12-29 RX ADMIN — Medication 10 ML: at 12:11

## 2017-12-29 RX ADMIN — SODIUM CHLORIDE 100 ML: 900 INJECTION, SOLUTION INTRAVENOUS at 12:11

## 2017-12-29 RX ADMIN — DIATRIZOATE MEGLUMINE AND DIATRIZOATE SODIUM 15 ML: 660; 100 LIQUID ORAL; RECTAL at 12:11

## 2017-12-29 RX ADMIN — IOPAMIDOL 100 ML: 755 INJECTION, SOLUTION INTRAVENOUS at 12:11

## 2018-01-01 ENCOUNTER — HOSPITAL ENCOUNTER (OUTPATIENT)
Dept: LAB | Age: 83
Discharge: HOME | End: 2018-11-07
Payer: MEDICARE

## 2018-01-01 ENCOUNTER — HOSPITAL ENCOUNTER (OUTPATIENT)
Dept: LAB | Age: 83
Discharge: HOME | End: 2018-09-26
Payer: MEDICARE

## 2018-01-01 ENCOUNTER — HOSPITAL ENCOUNTER (OUTPATIENT)
Dept: PHYSICAL THERAPY | Age: 83
Discharge: HOME | End: 2018-11-01
Payer: MEDICARE

## 2018-01-01 ENCOUNTER — HOSPITAL ENCOUNTER (OUTPATIENT)
Dept: PHYSICAL THERAPY | Age: 83
Discharge: HOME | End: 2018-10-18
Payer: MEDICARE

## 2018-01-01 ENCOUNTER — HOSPITAL ENCOUNTER (OUTPATIENT)
Dept: PHYSICAL THERAPY | Age: 83
Discharge: HOME | End: 2018-11-12
Payer: MEDICARE

## 2018-01-01 ENCOUNTER — HOSPITAL ENCOUNTER (OUTPATIENT)
Dept: PHYSICAL THERAPY | Age: 83
Discharge: HOME | End: 2018-11-15
Payer: MEDICARE

## 2018-01-01 ENCOUNTER — HOSPITAL ENCOUNTER (OUTPATIENT)
Dept: PHYSICAL THERAPY | Age: 83
Discharge: HOME | End: 2018-10-25
Payer: MEDICARE

## 2018-01-01 ENCOUNTER — HOSPITAL ENCOUNTER (OUTPATIENT)
Dept: PHYSICAL THERAPY | Age: 83
Discharge: HOME | End: 2018-10-10
Payer: MEDICARE

## 2018-01-01 ENCOUNTER — HOSPITAL ENCOUNTER (OUTPATIENT)
Dept: PHYSICAL THERAPY | Age: 83
Discharge: HOME | End: 2018-11-08
Payer: MEDICARE

## 2018-01-01 ENCOUNTER — HOSPITAL ENCOUNTER (OUTPATIENT)
Dept: PHYSICAL THERAPY | Age: 83
Discharge: HOME | End: 2018-10-29
Payer: MEDICARE

## 2018-01-01 ENCOUNTER — HOSPITAL ENCOUNTER (OUTPATIENT)
Dept: PHYSICAL THERAPY | Age: 83
Discharge: HOME | End: 2018-10-23
Payer: MEDICARE

## 2018-01-01 ENCOUNTER — HOSPITAL ENCOUNTER (OUTPATIENT)
Dept: PHYSICAL THERAPY | Age: 83
Discharge: HOME | End: 2018-11-06
Payer: MEDICARE

## 2018-01-01 DIAGNOSIS — R53.82 CHRONIC FATIGUE: ICD-10-CM

## 2018-01-01 DIAGNOSIS — Z79.899 LONG TERM CURRENT USE OF ANTIARRHYTHMIC MEDICAL THERAPY: ICD-10-CM

## 2018-01-01 LAB
T3 SERPL-MCNC: 0.96 NG/ML (ref 0.6–1.81)
T3FREE SERPL-MCNC: 2.6 PG/ML (ref 2–4.4)
T4 FREE SERPL-MCNC: 1 NG/DL (ref 0.78–1.46)
T4 FREE SERPL-MCNC: 1 NG/DL (ref 0.78–1.46)
THYROGLOB AB SERPL-ACNC: <1 IU/ML (ref 0–0.9)
THYROPEROXIDASE AB SERPL-ACNC: 7 IU/ML (ref 0–34)
TSH SERPL DL<=0.005 MIU/L-ACNC: 2.34 UIU/ML (ref 0.36–3.74)

## 2018-01-01 PROCEDURE — 84480 ASSAY TRIIODOTHYRONINE (T3): CPT

## 2018-01-01 PROCEDURE — 36415 COLL VENOUS BLD VENIPUNCTURE: CPT

## 2018-01-01 PROCEDURE — 97113 AQUATIC THERAPY/EXERCISES: CPT

## 2018-01-01 PROCEDURE — 86376 MICROSOMAL ANTIBODY EACH: CPT

## 2018-01-01 PROCEDURE — 84443 ASSAY THYROID STIM HORMONE: CPT

## 2018-01-01 PROCEDURE — 84481 FREE ASSAY (FT-3): CPT

## 2018-01-01 PROCEDURE — 97161 PT EVAL LOW COMPLEX 20 MIN: CPT

## 2018-01-01 PROCEDURE — 84439 ASSAY OF FREE THYROXINE: CPT

## 2018-01-01 PROCEDURE — G8978 MOBILITY CURRENT STATUS: HCPCS

## 2018-01-01 PROCEDURE — 97110 THERAPEUTIC EXERCISES: CPT

## 2018-01-01 PROCEDURE — G8979 MOBILITY GOAL STATUS: HCPCS

## 2018-01-31 ENCOUNTER — HOSPITAL ENCOUNTER (OUTPATIENT)
Dept: ULTRASOUND IMAGING | Age: 83
Discharge: HOME | End: 2018-01-31
Attending: OBSTETRICS & GYNECOLOGY
Payer: MEDICARE

## 2018-01-31 DIAGNOSIS — R19.00 PELVIC MASS IN FEMALE: ICD-10-CM

## 2018-01-31 PROCEDURE — 76856 US EXAM PELVIC COMPLETE: CPT

## 2018-02-02 PROBLEM — R19.00 PELVIC MASS IN FEMALE: Status: ACTIVE | Noted: 2018-02-02

## 2018-02-06 ENCOUNTER — HOSPITAL ENCOUNTER (OUTPATIENT)
Age: 83
Setting detail: OBSERVATION
Discharge: HOME | End: 2018-02-06
Attending: EMERGENCY MEDICINE | Admitting: INTERNAL MEDICINE
Payer: MEDICARE

## 2018-02-06 ENCOUNTER — APPOINTMENT (OUTPATIENT)
Dept: GENERAL RADIOLOGY | Age: 83
End: 2018-02-06
Attending: EMERGENCY MEDICINE
Payer: MEDICARE

## 2018-02-06 ENCOUNTER — APPOINTMENT (OUTPATIENT)
Dept: CT IMAGING | Age: 83
End: 2018-02-06
Attending: EMERGENCY MEDICINE
Payer: MEDICARE

## 2018-02-06 VITALS
SYSTOLIC BLOOD PRESSURE: 154 MMHG | HEART RATE: 83 BPM | HEIGHT: 66 IN | DIASTOLIC BLOOD PRESSURE: 67 MMHG | TEMPERATURE: 98.2 F | RESPIRATION RATE: 18 BRPM | OXYGEN SATURATION: 96 %

## 2018-02-06 DIAGNOSIS — R55 SYNCOPE AND COLLAPSE: Primary | ICD-10-CM

## 2018-02-06 LAB
ALBUMIN SERPL-MCNC: 3.2 G/DL (ref 3.2–4.6)
ALBUMIN/GLOB SERPL: 1 {RATIO} (ref 1.2–3.5)
ALP SERPL-CCNC: 56 U/L (ref 50–136)
ALT SERPL-CCNC: 14 U/L (ref 12–65)
ANION GAP SERPL CALC-SCNC: 11 MMOL/L (ref 7–16)
AST SERPL-CCNC: 26 U/L (ref 15–37)
BILIRUB SERPL-MCNC: 0.4 MG/DL (ref 0.2–1.1)
BNP SERPL-MCNC: 78 PG/ML
BUN SERPL-MCNC: 17 MG/DL (ref 8–23)
CALCIUM SERPL-MCNC: 9.2 MG/DL (ref 8.3–10.4)
CHLORIDE SERPL-SCNC: 97 MMOL/L (ref 98–107)
CO2 SERPL-SCNC: 28 MMOL/L (ref 21–32)
CREAT SERPL-MCNC: 1.14 MG/DL (ref 0.6–1)
ERYTHROCYTE [DISTWIDTH] IN BLOOD BY AUTOMATED COUNT: 13.4 % (ref 11.9–14.6)
GLOBULIN SER CALC-MCNC: 3.3 G/DL (ref 2.3–3.5)
GLUCOSE SERPL-MCNC: 118 MG/DL (ref 65–100)
HCT VFR BLD AUTO: 41.6 % (ref 35.8–46.3)
HGB BLD-MCNC: 13.9 G/DL (ref 11.7–15.4)
INR PPP: 0.9
MAGNESIUM SERPL-MCNC: 1.4 MG/DL (ref 1.8–2.4)
MCH RBC QN AUTO: 30.2 PG (ref 26.1–32.9)
MCHC RBC AUTO-ENTMCNC: 33.4 G/DL (ref 31.4–35)
MCV RBC AUTO: 90.2 FL (ref 79.6–97.8)
PHOSPHATE SERPL-MCNC: 3.1 MG/DL (ref 2.3–3.7)
PLATELET # BLD AUTO: 252 K/UL (ref 150–450)
PMV BLD AUTO: 10.3 FL (ref 10.8–14.1)
POTASSIUM SERPL-SCNC: 3.8 MMOL/L (ref 3.5–5.1)
PROT SERPL-MCNC: 6.5 G/DL (ref 6.3–8.2)
PROTHROMBIN TIME: 12.2 SEC (ref 11.5–14.5)
RBC # BLD AUTO: 4.61 M/UL (ref 4.05–5.25)
SODIUM SERPL-SCNC: 136 MMOL/L (ref 136–145)
TROPONIN I BLD-MCNC: 0.01 NG/ML (ref 0.02–0.05)
WBC # BLD AUTO: 9.5 K/UL (ref 4.3–11.1)

## 2018-02-06 PROCEDURE — 80053 COMPREHEN METABOLIC PANEL: CPT | Performed by: EMERGENCY MEDICINE

## 2018-02-06 PROCEDURE — 84100 ASSAY OF PHOSPHORUS: CPT | Performed by: EMERGENCY MEDICINE

## 2018-02-06 PROCEDURE — 84484 ASSAY OF TROPONIN QUANT: CPT

## 2018-02-06 PROCEDURE — 94761 N-INVAS EAR/PLS OXIMETRY MLT: CPT | Performed by: EMERGENCY MEDICINE

## 2018-02-06 PROCEDURE — 96360 HYDRATION IV INFUSION INIT: CPT | Performed by: EMERGENCY MEDICINE

## 2018-02-06 PROCEDURE — 65660000000 HC RM CCU STEPDOWN

## 2018-02-06 PROCEDURE — 71046 X-RAY EXAM CHEST 2 VIEWS: CPT

## 2018-02-06 PROCEDURE — 85610 PROTHROMBIN TIME: CPT | Performed by: EMERGENCY MEDICINE

## 2018-02-06 PROCEDURE — 74011250636 HC RX REV CODE- 250/636: Performed by: EMERGENCY MEDICINE

## 2018-02-06 PROCEDURE — 99285 EMERGENCY DEPT VISIT HI MDM: CPT | Performed by: EMERGENCY MEDICINE

## 2018-02-06 PROCEDURE — 81003 URINALYSIS AUTO W/O SCOPE: CPT | Performed by: EMERGENCY MEDICINE

## 2018-02-06 PROCEDURE — 85027 COMPLETE CBC AUTOMATED: CPT | Performed by: EMERGENCY MEDICINE

## 2018-02-06 PROCEDURE — 93005 ELECTROCARDIOGRAM TRACING: CPT | Performed by: EMERGENCY MEDICINE

## 2018-02-06 PROCEDURE — 70450 CT HEAD/BRAIN W/O DYE: CPT

## 2018-02-06 PROCEDURE — 99218 HC RM OBSERVATION: CPT

## 2018-02-06 PROCEDURE — 83880 ASSAY OF NATRIURETIC PEPTIDE: CPT | Performed by: EMERGENCY MEDICINE

## 2018-02-06 PROCEDURE — 83735 ASSAY OF MAGNESIUM: CPT | Performed by: EMERGENCY MEDICINE

## 2018-02-06 RX ADMIN — SODIUM CHLORIDE 1000 ML: 900 INJECTION, SOLUTION INTRAVENOUS at 18:15

## 2018-02-06 NOTE — ED TRIAGE NOTES
Patient arrives with EMS from home. Patient fell not sure how she fell. Patient is confused of how she fell.

## 2018-02-06 NOTE — Clinical Note
Patient Class[de-identified] Observation [102] Type of Bed: Telemetry [19] Reason for Observation: syncope Admitting Diagnosis: Syncope [[id number]] Admitting Physician: Maida Cheatham [[id number]] Attending Physician: Maida Cheatham [[id number]]

## 2018-02-06 NOTE — ED PROVIDER NOTES
HPI Comments: 29-year-old female presents after a presumed syncopal episode. Patient reports that she had a normal day today she decided to walk out to get her mail. She denies any weakness dizziness or lightheadedness during this episode. This is a last seizure members before the paramedics were around her outside of her apartment at [facility]. Patient does have a cardiac history but denies history of syncopal episodes. Patient denied any chest pain shortness of breath or weakness prior to the episode. Currently the patient states she feels a little weak but otherwise is denying any chest pain or shortness of breath. She has no abdominal pain no neck or back pain. Patient does have some chronic knee and hip pain but these are unchanged from her baseline. Patient denies any history of seizures    Of note, the patient is due for hysterectomy secondary to a recently discovered pelvic mass the mass itself has been asymptomatic and was discovered during workup for epigastric abdominal pain by her GI physician. Patient denies feeling any shock or change from her implantable defibrillator. Patient is a [de-identified] y.o. female presenting with fall. The history is provided by the patient and the EMS personnel. Fall   The accident occurred 1 to 2 hours ago. The fall occurred while walking. She fell from a height of ground level. She landed on dirt. There was no blood loss. The point of impact was the head. The pain is present in the head. The patient is experiencing no pain. She was not ambulatory at the scene. There was no entrapment after the fall. There was no drug use involved in the accident. There was no alcohol use involved in the accident. Associated symptoms include loss of consciousness. Pertinent negatives include no visual change, no fever, no abdominal pain, no vomiting, no headaches and no extremity weakness. The risk factors include being elderly. The symptoms are aggravated by activity.  She has tried nothing for the symptoms. The patient's last tetanus shot was less than 5 years ago. Past Medical History:   Diagnosis Date    Anemia     no recent episodes    Anxiety     managed with medication     Arthritis     generalized     Bundle branch block, left     managed with medication     CAD (coronary artery disease) 2004    x 1 stent     Chicken pox     Chronic systolic CHF (congestive heart failure) ([facility] [address] 3/13/2017    Claustrophobia     Depression     managed with medication     Diverticulitis     resolved    Elevated cholesterol     managed with medication     H/O: whooping cough     Hiatal hernia     with bleeding, surgically repaired     Hypertension     managed with medication     Measles     Mumps     NSAID sensitivity     Post-menopausal     PUD (peptic ulcer disease)     bleeding ulcers, cannot take NSAIDS     Pyloric ulcer associated with Helicobacter pylori 5236       Past Surgical History:   Procedure Laterality Date    HX BREAST BIOPSY Left     Benign cyst    HX CATARACT REMOVAL Bilateral 2011    HX HEART CATHETERIZATION      STENT X 2    HX HERNIA REPAIR  2011    Hiatal     HX HIP REPLACEMENT Right     HX KNEE ARTHROSCOPY Right 2007    right    HX OTHER SURGICAL  03/2017    ICD moniter inserted    HX PACEMAKER      NEUROLOGICAL PROCEDURE UNLISTED      back surgery    PA LEFT HEART CATH,PERCUTANEOUS  2010/2004    PCI to LAD x1          Family History:   Problem Relation Age of Onset    Hypertension Father     Breast Cancer Mother 55    Cancer Mother     Colon Cancer Neg Hx     Ovarian Cancer Neg Hx        Social History     Social History    Marital status: SINGLE     Spouse name: N/A    Number of children: N/A    Years of education: N/A     Occupational History    Not on file.      Social History Main Topics    Smoking status: Never Smoker    Smokeless tobacco: Never Used    Alcohol use Yes      Comment: occassional    Drug use: No    Sexual activity: Not Currently     Other Topics Concern    Not on file     Social History Narrative         ALLERGIES: Latex; Lactose; Adhesive; Atorvastatin; Augmentin [amoxicillin-pot clavulanate]; Codeine; Ezetimibe; Fluoxetine; Percocet [oxycodone-acetaminophen]; Potassium; Pravastatin; Prednisone; Simvastatin; and Wellbutrin [bupropion]    Review of Systems   Constitutional: Negative for chills and fever. HENT: Negative for congestion, ear pain and rhinorrhea. Eyes: Negative for photophobia and discharge. Respiratory: Negative for cough and shortness of breath. Cardiovascular: Negative for chest pain and palpitations. Gastrointestinal: Negative for abdominal pain, constipation, diarrhea and vomiting. Endocrine: Negative for cold intolerance and heat intolerance. Genitourinary: Negative for dysuria and flank pain. Musculoskeletal: Negative for arthralgias, extremity weakness, myalgias and neck pain. Skin: Negative for rash and wound. Allergic/Immunologic: Negative for environmental allergies and food allergies. Neurological: Positive for loss of consciousness. Negative for syncope and headaches. Hematological: Negative for adenopathy. Does not bruise/bleed easily. Psychiatric/Behavioral: Negative for dysphoric mood. The patient is not nervous/anxious. All other systems reviewed and are negative. Vitals:    02/06/18 1635   BP: 161/81   Pulse: 78   Resp: 18   Temp: 98.2 °F (36.8 °C)   SpO2: 96%   Height: 5' 6\" (1.676 m)            Physical Exam   Constitutional: She is oriented to person, place, and time. She appears well-developed and well-nourished. She appears distressed. HENT:   Head: Normocephalic and atraumatic. Mouth/Throat: Oropharynx is clear and moist.   Eyes: Conjunctivae and EOM are normal. Pupils are equal, round, and reactive to light. Right eye exhibits no discharge. Left eye exhibits no discharge. No scleral icterus.    Neck: Trachea normal and normal range of motion. Neck supple. No spinous process tenderness and no muscular tenderness present. Normal range of motion present. No thyroid mass and no thyromegaly present. Cardiovascular: Normal rate, regular rhythm, normal heart sounds and intact distal pulses. Exam reveals no gallop and no friction rub. No murmur heard. Pulmonary/Chest: Effort normal and breath sounds normal. No respiratory distress. She has no wheezes. She exhibits no tenderness. Abdominal: Soft. Bowel sounds are normal. She exhibits no distension. There is no hepatosplenomegaly. There is no tenderness. There is no rebound and no guarding. No hernia. Musculoskeletal: Normal range of motion. She exhibits no edema or tenderness. Neurological: She is alert and oriented to person, place, and time. No cranial nerve deficit. She exhibits normal muscle tone. Skin: Skin is warm, dry and intact. No rash noted. She is not diaphoretic. No erythema. There is pallor. Psychiatric: She has a normal mood and affect. Her speech is normal and behavior is normal. Judgment and thought content normal. Cognition and memory are normal.   Nursing note and vitals reviewed. MDM  Number of Diagnoses or Management Options  Syncope and collapse: new and requires workup  Diagnosis management comments: EKG reviewed  Sinus rhythm  Multifocal PVCs  Interventricular conduction delay  No acute ischemic changes    5:10 PM  We'll consult with [employer] to interrogate the patient's patient defibrillator    5:39 PM  received a call from [employer] rep  Updated on patient's history  Coming to ER to interrogate the patient's device    8:11 PM  Patient seen by the hospitalist service, Dr. Matt Phoenix. Patient is refusing admission and will sign out [address]   Dr. Matt Phoenix discussed the risks and benefits with the patient also discussed close follow-up with her cardiologist.       Amount and/or Complexity of Data Reviewed  Clinical lab tests: ordered and reviewed  Tests in the radiology section of CPT®: ordered and reviewed  Tests in the medicine section of CPT®: ordered and reviewed  Review and summarize past medical records: yes  Discuss the patient with other providers: yes  Independent visualization of images, tracings, or specimens: yes    Risk of Complications, Morbidity, and/or Mortality  Presenting problems: high  Diagnostic procedures: high  Management options: high  General comments: Elements of this note have been dictated via voice recognition software. Text and phrases may be limited by the accuracy of the software. The chart has been reviewed, but errors may still be present.       Patient Progress  Patient progress: stable        ED Course       Procedures

## 2018-02-07 NOTE — CONSULTS
HOSPITALIST CONSULT      NAME:  Angela Ramirez   Age:  [de-identified] y.o.  :   1935   MRN:   [id number]    PCP: Candido Eller MD    Attending MD: Theresa Yu MD    Treatment Team: Attending Provider: Theresa Yu MD; Primary Nurse: Jose Raul Zhu RN    HPI:     Angela Ramirez is a 51LGA with chronic sCHF (with ICD), HTN, PVCs who presented to [facility] ED after a syncopal episode today. She reports that she was in her normal state of health today, went to get her mail, and passed out. Next thing she knows, she was surrounded by EMS and staff from her independent living. She had no prodromal symptoms prior to the episode. Denies dizziness/LH, palpitations, CP, SOB. In the ED, her ICD was interrogated and showed no firing and only PVCs, which are typical for her. Hospitalist asked to admit for further workup of syncope.         Complete ROS done and is as stated in HPI or otherwise negative    Past Medical History:   Diagnosis Date    Anemia     no recent episodes    Anxiety     managed with medication     Arthritis     generalized     Bundle branch block, left     managed with medication     CAD (coronary artery disease) 2004    x 1 stent     Chicken pox     Chronic systolic CHF (congestive heart failure) ([facility] [address] 3/13/2017    Claustrophobia     Depression     managed with medication     Diverticulitis     resolved    Elevated cholesterol     managed with medication     H/O: whooping cough     Hiatal hernia     with bleeding, surgically repaired     Hypertension     managed with medication     Measles     Mumps     NSAID sensitivity     Post-menopausal     PUD (peptic ulcer disease)     bleeding ulcers, cannot take NSAIDS     Pyloric ulcer associated with Helicobacter pylori 4819        Past Surgical History:   Procedure Laterality Date    HX BREAST BIOPSY Left     Benign cyst    HX CATARACT REMOVAL Bilateral     HX HEART CATHETERIZATION      STENT X 2    HX HERNIA REPAIR  2011    Hiatal  HX HIP REPLACEMENT Right     HX KNEE ARTHROSCOPY Right     right    HX OTHER SURGICAL  2017    ICD moniter inserted    HX PACEMAKER      NEUROLOGICAL PROCEDURE UNLISTED      back surgery    CA LEFT HEART CATH,PERCUTANEOUS      PCI to LAD x1         Prior to Admission Medications   Prescriptions Last Dose Informant Patient Reported? Taking? ALPRAZolam (XANAX) 0.25 mg tablet   Yes No   Sig: Take 0.25 mg by mouth three (3) times daily as needed. CYANOCOBALAMIN/COBAMAMIDE (B-12 PLUS SL)   Yes No   Sig: by SubLINGual route. DIETARY SUPPLEMENT PO   Yes No   Sig: Take  by mouth. Deplin   DULoxetine (CYMBALTA) 60 mg capsule   Yes No   Sig: Take 60 mg by mouth every morning. Dexlansoprazole (DEXILANT) 60 mg CpDB   Yes No   Sig: Take  by mouth. VITS A,C,E/LUTEIN/MINERALS (OCUVITE WITH LUTEIN PO)   Yes No   Sig: Take  by mouth. 25mg-5mg daily   amLODIPine (NORVASC) 5 mg tablet   No No   Sig: Take 1 Tab by mouth daily. amiodarone (PACERONE) 100 mg tablet   No No   Sig: Take 1 Tab by mouth daily. aspirin 81 mg chewable tablet   Yes No   Sig: Take 1 Tab by mouth daily. cholecalciferol, VITAMIN D3, (VITAMIN D3) 5,000 unit tab tablet   Yes No   Sig: Take  by mouth daily. co-enzyme Q-10 (CO Q-10) 100 mg capsule   Yes No   Sig: Take 100 mg by mouth daily. cycloSPORINE (RESTASIS) 0.05 % ophthalmic emulsion   Yes No   Sig: Administer 1 Drop to both eyes two (2) times a day. hydroCHLOROthiazide (HYDRODIURIL) 12.5 mg tablet   No No   Sig: Take 1 Tab by mouth daily. losartan (COZAAR) 25 mg tablet   No No   Sig: Take 2 Tabs by mouth every morning. nitroglycerin (NITROSTAT) 0.4 mg SL tablet   No No   Si Tab by SubLINGual route every five (5) minutes as needed for Chest Pain. sucralfate (CARAFATE) 1 gram tablet   No No   Sig: Take 1 Tab by mouth four (4) times daily. zolpidem (AMBIEN) 10 mg tablet   Yes No   Sig: Take 5 mg by mouth nightly.       Facility-Administered Medications: None       Allergies   Allergen Reactions    Latex Rash    Lactose Diarrhea    Adhesive Rash    Atorvastatin Other (comments)     Muscle aches    Augmentin [Amoxicillin-Pot Clavulanate] Other (comments)     GI Upset      Codeine Hives    Ezetimibe Other (comments)     Muscle aches    Fluoxetine Cough    Percocet [Oxycodone-Acetaminophen] Hives    Potassium Nausea Only    Pravastatin Myalgia    Prednisone Nausea and Vomiting    Simvastatin Unknown (comments)    Wellbutrin [Bupropion] Unknown (comments)        Social History   Substance Use Topics    Smoking status: Never Smoker    Smokeless tobacco: Never Used    Alcohol use Yes      Comment: occassional        Family History   Problem Relation Age of Onset    Hypertension Father     Breast Cancer Mother 55    Cancer Mother     Colon Cancer Neg Hx     Ovarian Cancer Neg Hx         Objective:       Visit Vitals    /67    Pulse 83    Temp 98.2 °F (36.8 °C)    Resp 18    Ht 5' 6\" (1.676 m)    SpO2 96%        Temp (24hrs), Av.2 °F (36.8 °C), Min:98.2 °F (36.8 °C), Max:98.2 °F (36.8 °C)      Oxygen Therapy  O2 Sat (%): 96 % (18 1850)  Pulse via Oximetry: 80 beats per minute (18 1850)  O2 Device: Room air (18 1635)      Physical Exam:      General:    Alert, cooperative, NAD  Eyes:   Anicteric  Head:   Normocephalic, without obvious abnormality, atraumatic. ENT:  Dry MM  Lungs:   Clear to auscultation bilaterally. Heart:   Regular rate and rhythm with frequent ectopy  Abdomen:   Soft, NTTP  MSK:  Spontaneously moves extremities. No deformities/lesions. Skin:     Not Jaundiced. Neurologic: Alert and oriented x 3, no focal deficits   Psychiatry:      anxious  Heme/Lymph/Immune: No petechiae, echymoses, overt signs of bleeding or lymphadenopathy.       Data Review:   Recent Results (from the past 24 hour(s))   BNP    Collection Time: 18  6:08 PM   Result Value Ref Range    BNP 78 pg/mL   PROTHROMBIN TIME + INR Collection Time: 02/06/18  6:08 PM   Result Value Ref Range    Prothrombin time 12.2 11.5 - 14.5 sec    INR 0.9     CBC W/O DIFF    Collection Time: 02/06/18  6:08 PM   Result Value Ref Range    WBC 9.5 4.3 - 11.1 K/uL    RBC 4.61 4.05 - 5.25 M/uL    HGB 13.9 11.7 - 15.4 g/dL    HCT 41.6 35.8 - 46.3 %    MCV 90.2 79.6 - 97.8 FL    MCH 30.2 26.1 - 32.9 PG    MCHC 33.4 31.4 - 35.0 g/dL    RDW 13.4 11.9 - 14.6 %    PLATELET 719 250 - 798 K/uL    MPV 10.3 (L) 10.8 - 14.1 FL   MAGNESIUM    Collection Time: 02/06/18  6:08 PM   Result Value Ref Range    Magnesium 1.4 (LL) 1.8 - 2.4 mg/dL   METABOLIC PANEL, COMPREHENSIVE    Collection Time: 02/06/18  6:08 PM   Result Value Ref Range    Sodium 136 136 - 145 mmol/L    Potassium 3.8 3.5 - 5.1 mmol/L    Chloride 97 (L) 98 - 107 mmol/L    CO2 28 21 - 32 mmol/L    Anion gap 11 7 - 16 mmol/L    Glucose 118 (H) 65 - 100 mg/dL    BUN 17 8 - 23 MG/DL    Creatinine 1.14 (H) 0.6 - 1.0 MG/DL    GFR est AA 59 (L) >60 ml/min/1.73m2    GFR est non-AA 49 (L) >60 ml/min/1.73m2    Calcium 9.2 8.3 - 10.4 MG/DL    Bilirubin, total 0.4 0.2 - 1.1 MG/DL    ALT (SGPT) 14 12 - 65 U/L    AST (SGOT) 26 15 - 37 U/L    Alk. phosphatase 56 50 - 136 U/L    Protein, total 6.5 6.3 - 8.2 g/dL    Albumin 3.2 3.2 - 4.6 g/dL    Globulin 3.3 2.3 - 3.5 g/dL    A-G Ratio 1.0 (L) 1.2 - 3.5     PHOSPHORUS    Collection Time: 02/06/18  6:08 PM   Result Value Ref Range    Phosphorus 3.1 2.3 - 3.7 MG/DL   POC TROPONIN-I    Collection Time: 02/06/18  6:11 PM   Result Value Ref Range    Troponin-I (POC) 0.01 (L) 0.02 - 0.05 ng/ml       Imaging /Procedures /Studies   XR CHEST PA LAT   Final Result   Impression: Stable two-view chest.            CT HEAD WO CONT   Final Result   Impression: Chronic appearing white matter changes and generalized cerebral   atrophy without acute intracranial abnormality. Assessment and Plan:        Active Hospital Problems    Diagnosis Date Noted    Syncope 02/06/2018 PLAN  Hospitalist asked to admit for syncope workup, especially given her cardiac history. Patient was adamant that she was not going to stay overnight. She then changed her story and said that she tripped on the way to the mailbox and passed out afterwards. This is inconsistent with the history that she and EMS provided the ED provider. Patient reports that she was a little dehydrated and feels better after receiving IVF. She emphasizes that she will continue to push po fluids at home. I continue to recommend admission, and patient will have to sign out AMA. I recommended close followup with Dr. Ubaldo Lerma (patient's cardiologist) and holding HCTZ until that appt as it is likely contributing to her dehydration.       Jeremy Payan MD  8:12 PM

## 2018-02-09 LAB
CALCULATED P AXIS, ECG09: 89 DEGREES
CALCULATED R AXIS, ECG10: -65 DEGREES
CALCULATED T AXIS, ECG11: 59 DEGREES
DIAGNOSIS, 93000: NORMAL
P-R INTERVAL, ECG05: 132 MS
Q-T INTERVAL, ECG07: 374 MS
QRS DURATION, ECG06: 138 MS
QTC CALCULATION (BEZET), ECG08: 432 MS
VENTRICULAR RATE, ECG03: 80 BPM

## 2018-02-14 ENCOUNTER — HOSPITAL ENCOUNTER (OUTPATIENT)
Dept: SURGERY | Age: 83
Discharge: HOME | End: 2018-02-14

## 2018-02-14 VITALS
DIASTOLIC BLOOD PRESSURE: 87 MMHG | HEIGHT: 67 IN | TEMPERATURE: 98.1 F | WEIGHT: 140 LBS | OXYGEN SATURATION: 96 % | HEART RATE: 91 BPM | SYSTOLIC BLOOD PRESSURE: 152 MMHG | BODY MASS INDEX: 21.97 KG/M2 | RESPIRATION RATE: 16 BRPM

## 2018-02-14 NOTE — PERIOP NOTES
Patient verified name, , and surgery as listed in [facility]. Patient provided medical/health information and PTA medications to the best of their ability. TYPE  CASE:2  Orders per surgeon: Received in [facility]  Labs per surgeon:cbc, bmp. Results: see [facility] results from 18  Labs per anesthesia protocol: see [facility] results from 18  EKG  :  ekg 17 on pts chart    Patient provided with and instructed on education handouts including Guide to Surgery, blood transfusions, pain management, and hand hygiene for the family and community, and [place] [id number] [facility] brochure.     hibliclens and instructions given per hospital policy. Instructed patient to continue previous medications as prescribed prior to surgery unless otherwise directed and to take the following medications the day of surgery according to anesthesia guidelines : duloxetine,amiodarone,aspirin,amlodipine,dexilant . Instructed patient to hold  the following medications: all vitamins supplements and nsaids. Original medication prescription bottles  visualized during patient appointment. Patient teach back successful and patient demonstrates knowledge of instruction. Dr. Alexa Giron made aware of pts pacemaker/ICD. Asked him if rep was needed day of surgery. Dr. Alexa Giron stated rep was not needed.  OR  made aware that pt has pacemaker and ICD

## 2018-02-15 NOTE — PERIOP NOTES
Received phone call from Gerardo Perry NP for Dr. Nancy Guerrero. Orders for DOS are listed in [facility], under signed and held. Ms. Tara Daniels stated surgery orders need to be changed and she has been unable to do this in [facility]. Received verbal order and repeated back that surgery is to be listed as diagnostic laparoscopy with possible biopsy. Possible total abdominal hysterectomy. Possible bilateral salpingoophorectomy. Possible staging. Wrote order, faxed to pharmacy and placed on front of inside chart. Spoke with Gerardo Booth in scheduling for surgery consent and posting to state same surgery.

## 2018-02-19 ENCOUNTER — ANESTHESIA EVENT (OUTPATIENT)
Dept: SURGERY | Age: 83
End: 2018-02-19
Payer: MEDICARE

## 2018-02-20 ENCOUNTER — HOSPITAL ENCOUNTER (OUTPATIENT)
Age: 83
Setting detail: OBSERVATION
Discharge: HOME | End: 2018-02-21
Attending: OBSTETRICS & GYNECOLOGY | Admitting: OBSTETRICS & GYNECOLOGY
Payer: MEDICARE

## 2018-02-20 ENCOUNTER — ANESTHESIA (OUTPATIENT)
Dept: SURGERY | Age: 83
End: 2018-02-20
Payer: MEDICARE

## 2018-02-20 PROBLEM — D27.0: Status: ACTIVE | Noted: 2018-02-20

## 2018-02-20 LAB
ABO + RH BLD: NORMAL
BLOOD GROUP ANTIBODIES SERPL: NORMAL
SPECIMEN EXP DATE BLD: NORMAL

## 2018-02-20 PROCEDURE — 74011000250 HC RX REV CODE- 250

## 2018-02-20 PROCEDURE — 76210000006 HC OR PH I REC 0.5 TO 1 HR: Performed by: OBSTETRICS & GYNECOLOGY

## 2018-02-20 PROCEDURE — 77030008477 HC STYL SATN SLP COVD -A: Performed by: ANESTHESIOLOGY

## 2018-02-20 PROCEDURE — 74011250637 HC RX REV CODE- 250/637: Performed by: OBSTETRICS & GYNECOLOGY

## 2018-02-20 PROCEDURE — 77030020782 HC GWN BAIR PAWS FLX 3M -B: Performed by: ANESTHESIOLOGY

## 2018-02-20 PROCEDURE — 74011250636 HC RX REV CODE- 250/636: Performed by: NURSE PRACTITIONER

## 2018-02-20 PROCEDURE — 74011250636 HC RX REV CODE- 250/636: Performed by: OBSTETRICS & GYNECOLOGY

## 2018-02-20 PROCEDURE — 88112 CYTOPATH CELL ENHANCE TECH: CPT | Performed by: OBSTETRICS & GYNECOLOGY

## 2018-02-20 PROCEDURE — 77030022703 HC LIGASURE  BLNT LAPSCP SEAL COVD -E: Performed by: OBSTETRICS & GYNECOLOGY

## 2018-02-20 PROCEDURE — 77030010507 HC ADH SKN DERMBND J&J -B: Performed by: OBSTETRICS & GYNECOLOGY

## 2018-02-20 PROCEDURE — 65270000029 HC RM PRIVATE

## 2018-02-20 PROCEDURE — 88305 TISSUE EXAM BY PATHOLOGIST: CPT | Performed by: OBSTETRICS & GYNECOLOGY

## 2018-02-20 PROCEDURE — 77030020407 HC IV BLD WRMR ST 3M -A: Performed by: ANESTHESIOLOGY

## 2018-02-20 PROCEDURE — 88331 PATH CONSLTJ SURG 1 BLK 1SPC: CPT | Performed by: OBSTETRICS & GYNECOLOGY

## 2018-02-20 PROCEDURE — 99218 HC RM OBSERVATION: CPT

## 2018-02-20 PROCEDURE — 77030027138 HC INCENT SPIROMETER -A

## 2018-02-20 PROCEDURE — 76060000035 HC ANESTHESIA 2 TO 2.5 HR: Performed by: OBSTETRICS & GYNECOLOGY

## 2018-02-20 PROCEDURE — 77030034849: Performed by: OBSTETRICS & GYNECOLOGY

## 2018-02-20 PROCEDURE — 77030002966 HC SUT PDS J&J -A: Performed by: OBSTETRICS & GYNECOLOGY

## 2018-02-20 PROCEDURE — 77030003029 HC SUT VCRL J&J -B: Performed by: OBSTETRICS & GYNECOLOGY

## 2018-02-20 PROCEDURE — 77030019908 HC STETH ESOPH SIMS -A: Performed by: ANESTHESIOLOGY

## 2018-02-20 PROCEDURE — 77030018836 HC SOL IRR NACL ICUM -A: Performed by: OBSTETRICS & GYNECOLOGY

## 2018-02-20 PROCEDURE — 74011250636 HC RX REV CODE- 250/636

## 2018-02-20 PROCEDURE — 77030011640 HC PAD GRND REM COVD -A: Performed by: OBSTETRICS & GYNECOLOGY

## 2018-02-20 PROCEDURE — 74011000250 HC RX REV CODE- 250: Performed by: OBSTETRICS & GYNECOLOGY

## 2018-02-20 PROCEDURE — 77030008606 HC TRCR ENDOSC KII AMR -B: Performed by: OBSTETRICS & GYNECOLOGY

## 2018-02-20 PROCEDURE — 77030032490 HC SLV COMPR SCD KNE COVD -B: Performed by: OBSTETRICS & GYNECOLOGY

## 2018-02-20 PROCEDURE — 77030008467 HC STPLR SKN COVD -B: Performed by: OBSTETRICS & GYNECOLOGY

## 2018-02-20 PROCEDURE — 76010000162 HC OR TIME 1.5 TO 2 HR INTENSV-TIER 1: Performed by: OBSTETRICS & GYNECOLOGY

## 2018-02-20 PROCEDURE — 74011250637 HC RX REV CODE- 250/637: Performed by: ANESTHESIOLOGY

## 2018-02-20 PROCEDURE — 74011250636 HC RX REV CODE- 250/636: Performed by: ANESTHESIOLOGY

## 2018-02-20 PROCEDURE — 77030008703 HC TU ET UNCUF COVD -A: Performed by: ANESTHESIOLOGY

## 2018-02-20 PROCEDURE — 77030011264 HC ELECTRD BLD EXT COVD -A: Performed by: OBSTETRICS & GYNECOLOGY

## 2018-02-20 PROCEDURE — 86900 BLOOD TYPING SEROLOGIC ABO: CPT | Performed by: NURSE PRACTITIONER

## 2018-02-20 PROCEDURE — 77030007955 HC PCH ENDOSC SPEC J&J -B: Performed by: OBSTETRICS & GYNECOLOGY

## 2018-02-20 PROCEDURE — 51798 US URINE CAPACITY MEASURE: CPT

## 2018-02-20 PROCEDURE — 77030031139 HC SUT VCRL2 J&J -A: Performed by: OBSTETRICS & GYNECOLOGY

## 2018-02-20 RX ORDER — CYCLOSPORINE 0.5 MG/ML
1 EMULSION OPHTHALMIC EVERY 12 HOURS
Status: DISCONTINUED | OUTPATIENT
Start: 2018-02-20 | End: 2018-02-21 | Stop reason: HOSPADM

## 2018-02-20 RX ORDER — AMIODARONE HYDROCHLORIDE 200 MG/1
100 TABLET ORAL DAILY
Status: DISCONTINUED | OUTPATIENT
Start: 2018-02-21 | End: 2018-02-21 | Stop reason: HOSPADM

## 2018-02-20 RX ORDER — ROCURONIUM BROMIDE 10 MG/ML
INJECTION, SOLUTION INTRAVENOUS AS NEEDED
Status: DISCONTINUED | OUTPATIENT
Start: 2018-02-20 | End: 2018-02-20 | Stop reason: HOSPADM

## 2018-02-20 RX ORDER — GUAIFENESIN 100 MG/5ML
81 LIQUID (ML) ORAL DAILY
Status: DISCONTINUED | OUTPATIENT
Start: 2018-02-21 | End: 2018-02-21 | Stop reason: HOSPADM

## 2018-02-20 RX ORDER — NALOXONE HYDROCHLORIDE 0.4 MG/ML
0.1 INJECTION, SOLUTION INTRAMUSCULAR; INTRAVENOUS; SUBCUTANEOUS
Status: DISCONTINUED | OUTPATIENT
Start: 2018-02-20 | End: 2018-02-20 | Stop reason: HOSPADM

## 2018-02-20 RX ORDER — LIDOCAINE HYDROCHLORIDE 10 MG/ML
0.1 INJECTION INFILTRATION; PERINEURAL AS NEEDED
Status: DISCONTINUED | OUTPATIENT
Start: 2018-02-20 | End: 2018-02-20 | Stop reason: HOSPADM

## 2018-02-20 RX ORDER — SODIUM CHLORIDE 0.9 % (FLUSH) 0.9 %
5-10 SYRINGE (ML) INJECTION AS NEEDED
Status: DISCONTINUED | OUTPATIENT
Start: 2018-02-20 | End: 2018-02-21 | Stop reason: HOSPADM

## 2018-02-20 RX ORDER — HEPARIN SODIUM 5000 [USP'U]/ML
5000 INJECTION, SOLUTION INTRAVENOUS; SUBCUTANEOUS ONCE
Status: COMPLETED | OUTPATIENT
Start: 2018-02-20 | End: 2018-02-20

## 2018-02-20 RX ORDER — AMOXICILLIN 250 MG
2 CAPSULE ORAL DAILY
Status: DISCONTINUED | OUTPATIENT
Start: 2018-02-21 | End: 2018-02-21 | Stop reason: HOSPADM

## 2018-02-20 RX ORDER — OXYCODONE HYDROCHLORIDE 5 MG/1
10 TABLET ORAL
Status: DISCONTINUED | OUTPATIENT
Start: 2018-02-20 | End: 2018-02-20 | Stop reason: HOSPADM

## 2018-02-20 RX ORDER — KETAMINE HYDROCHLORIDE 50 MG/ML
INJECTION, SOLUTION INTRAMUSCULAR; INTRAVENOUS AS NEEDED
Status: DISCONTINUED | OUTPATIENT
Start: 2018-02-20 | End: 2018-02-20 | Stop reason: HOSPADM

## 2018-02-20 RX ORDER — SODIUM CHLORIDE, SODIUM LACTATE, POTASSIUM CHLORIDE, CALCIUM CHLORIDE 600; 310; 30; 20 MG/100ML; MG/100ML; MG/100ML; MG/100ML
75 INJECTION, SOLUTION INTRAVENOUS CONTINUOUS
Status: DISCONTINUED | OUTPATIENT
Start: 2018-02-20 | End: 2018-02-20 | Stop reason: HOSPADM

## 2018-02-20 RX ORDER — NEOSTIGMINE METHYLSULFATE 1 MG/ML
INJECTION INTRAVENOUS AS NEEDED
Status: DISCONTINUED | OUTPATIENT
Start: 2018-02-20 | End: 2018-02-20 | Stop reason: HOSPADM

## 2018-02-20 RX ORDER — NALOXONE HYDROCHLORIDE 0.4 MG/ML
0.4 INJECTION, SOLUTION INTRAMUSCULAR; INTRAVENOUS; SUBCUTANEOUS AS NEEDED
Status: DISCONTINUED | OUTPATIENT
Start: 2018-02-20 | End: 2018-02-21 | Stop reason: HOSPADM

## 2018-02-20 RX ORDER — OXYCODONE HYDROCHLORIDE 5 MG/1
5 TABLET ORAL
Status: DISCONTINUED | OUTPATIENT
Start: 2018-02-20 | End: 2018-02-21 | Stop reason: HOSPADM

## 2018-02-20 RX ORDER — SODIUM CHLORIDE 0.9 % (FLUSH) 0.9 %
5-10 SYRINGE (ML) INJECTION EVERY 8 HOURS
Status: DISCONTINUED | OUTPATIENT
Start: 2018-02-20 | End: 2018-02-20 | Stop reason: HOSPADM

## 2018-02-20 RX ORDER — PROCHLORPERAZINE EDISYLATE 5 MG/ML
5 INJECTION INTRAMUSCULAR; INTRAVENOUS
Status: DISCONTINUED | OUTPATIENT
Start: 2018-02-20 | End: 2018-02-21 | Stop reason: HOSPADM

## 2018-02-20 RX ORDER — SODIUM CHLORIDE 0.9 % (FLUSH) 0.9 %
5-10 SYRINGE (ML) INJECTION AS NEEDED
Status: DISCONTINUED | OUTPATIENT
Start: 2018-02-20 | End: 2018-02-20 | Stop reason: HOSPADM

## 2018-02-20 RX ORDER — LIDOCAINE HYDROCHLORIDE ANHYDROUS AND DEXTROSE MONOHYDRATE .4; 5 G/100ML; G/100ML
INJECTION, SOLUTION INTRAVENOUS
Status: DISCONTINUED | OUTPATIENT
Start: 2018-02-20 | End: 2018-02-20 | Stop reason: HOSPADM

## 2018-02-20 RX ORDER — OXYCODONE HYDROCHLORIDE 5 MG/1
5 TABLET ORAL
Status: DISCONTINUED | OUTPATIENT
Start: 2018-02-20 | End: 2018-02-20 | Stop reason: HOSPADM

## 2018-02-20 RX ORDER — GABAPENTIN 300 MG/1
300 CAPSULE ORAL ONCE
Status: COMPLETED | OUTPATIENT
Start: 2018-02-20 | End: 2018-02-20

## 2018-02-20 RX ORDER — IBUPROFEN 200 MG
200 TABLET ORAL
Status: DISCONTINUED | OUTPATIENT
Start: 2018-02-20 | End: 2018-02-21 | Stop reason: HOSPADM

## 2018-02-20 RX ORDER — LIDOCAINE HYDROCHLORIDE 20 MG/ML
INJECTION, SOLUTION EPIDURAL; INFILTRATION; INTRACAUDAL; PERINEURAL AS NEEDED
Status: DISCONTINUED | OUTPATIENT
Start: 2018-02-20 | End: 2018-02-20 | Stop reason: HOSPADM

## 2018-02-20 RX ORDER — FLUMAZENIL 0.1 MG/ML
0.2 INJECTION INTRAVENOUS AS NEEDED
Status: DISCONTINUED | OUTPATIENT
Start: 2018-02-20 | End: 2018-02-20 | Stop reason: HOSPADM

## 2018-02-20 RX ORDER — DULOXETIN HYDROCHLORIDE 60 MG/1
60 CAPSULE, DELAYED RELEASE ORAL
Status: DISCONTINUED | OUTPATIENT
Start: 2018-02-21 | End: 2018-02-21 | Stop reason: HOSPADM

## 2018-02-20 RX ORDER — SODIUM CHLORIDE, SODIUM LACTATE, POTASSIUM CHLORIDE, CALCIUM CHLORIDE 600; 310; 30; 20 MG/100ML; MG/100ML; MG/100ML; MG/100ML
50 INJECTION, SOLUTION INTRAVENOUS CONTINUOUS
Status: DISCONTINUED | OUTPATIENT
Start: 2018-02-20 | End: 2018-02-21

## 2018-02-20 RX ORDER — ACETAMINOPHEN 325 MG/1
325 TABLET ORAL
Status: DISCONTINUED | OUTPATIENT
Start: 2018-02-20 | End: 2018-02-21 | Stop reason: HOSPADM

## 2018-02-20 RX ORDER — SUCRALFATE 1 G/1
1 TABLET ORAL 4 TIMES DAILY
Status: DISCONTINUED | OUTPATIENT
Start: 2018-02-20 | End: 2018-02-21 | Stop reason: HOSPADM

## 2018-02-20 RX ORDER — HYDROMORPHONE HYDROCHLORIDE 2 MG/ML
0.5 INJECTION, SOLUTION INTRAMUSCULAR; INTRAVENOUS; SUBCUTANEOUS
Status: DISCONTINUED | OUTPATIENT
Start: 2018-02-20 | End: 2018-02-20 | Stop reason: HOSPADM

## 2018-02-20 RX ORDER — DIPHENHYDRAMINE HYDROCHLORIDE 50 MG/ML
12.5 INJECTION, SOLUTION INTRAMUSCULAR; INTRAVENOUS
Status: DISCONTINUED | OUTPATIENT
Start: 2018-02-20 | End: 2018-02-20 | Stop reason: HOSPADM

## 2018-02-20 RX ORDER — TRAMADOL HYDROCHLORIDE 50 MG/1
50 TABLET ORAL
Status: DISCONTINUED | OUTPATIENT
Start: 2018-02-20 | End: 2018-02-21 | Stop reason: HOSPADM

## 2018-02-20 RX ORDER — ONDANSETRON 8 MG/1
4 TABLET, ORALLY DISINTEGRATING ORAL
Status: DISCONTINUED | OUTPATIENT
Start: 2018-02-20 | End: 2018-02-21 | Stop reason: HOSPADM

## 2018-02-20 RX ORDER — ZOLPIDEM TARTRATE 5 MG/1
5 TABLET ORAL
Status: DISCONTINUED | OUTPATIENT
Start: 2018-02-20 | End: 2018-02-21 | Stop reason: HOSPADM

## 2018-02-20 RX ORDER — LOSARTAN POTASSIUM 50 MG/1
50 TABLET ORAL
Status: DISCONTINUED | OUTPATIENT
Start: 2018-02-21 | End: 2018-02-21 | Stop reason: HOSPADM

## 2018-02-20 RX ORDER — ALPRAZOLAM 0.25 MG/1
0.25 TABLET ORAL
Status: DISCONTINUED | OUTPATIENT
Start: 2018-02-20 | End: 2018-02-21 | Stop reason: HOSPADM

## 2018-02-20 RX ORDER — FENTANYL CITRATE 50 UG/ML
INJECTION, SOLUTION INTRAMUSCULAR; INTRAVENOUS AS NEEDED
Status: DISCONTINUED | OUTPATIENT
Start: 2018-02-20 | End: 2018-02-20 | Stop reason: HOSPADM

## 2018-02-20 RX ORDER — CEFAZOLIN SODIUM/WATER 2 G/20 ML
2 SYRINGE (ML) INTRAVENOUS ONCE
Status: COMPLETED | OUTPATIENT
Start: 2018-02-20 | End: 2018-02-20

## 2018-02-20 RX ORDER — PANTOPRAZOLE SODIUM 40 MG/1
40 TABLET, DELAYED RELEASE ORAL DAILY
Status: DISCONTINUED | OUTPATIENT
Start: 2018-02-21 | End: 2018-02-21 | Stop reason: HOSPADM

## 2018-02-20 RX ORDER — PROPOFOL 10 MG/ML
INJECTION, EMULSION INTRAVENOUS AS NEEDED
Status: DISCONTINUED | OUTPATIENT
Start: 2018-02-20 | End: 2018-02-20 | Stop reason: HOSPADM

## 2018-02-20 RX ORDER — NITROGLYCERIN 0.4 MG/1
0.4 TABLET SUBLINGUAL
Status: DISCONTINUED | OUTPATIENT
Start: 2018-02-20 | End: 2018-02-21 | Stop reason: HOSPADM

## 2018-02-20 RX ORDER — AMLODIPINE BESYLATE 5 MG/1
5 TABLET ORAL DAILY
Status: DISCONTINUED | OUTPATIENT
Start: 2018-02-21 | End: 2018-02-21 | Stop reason: HOSPADM

## 2018-02-20 RX ORDER — DIPHENHYDRAMINE HYDROCHLORIDE 50 MG/ML
12.5 INJECTION, SOLUTION INTRAMUSCULAR; INTRAVENOUS
Status: DISCONTINUED | OUTPATIENT
Start: 2018-02-20 | End: 2018-02-21 | Stop reason: HOSPADM

## 2018-02-20 RX ORDER — GLYCOPYRROLATE 0.2 MG/ML
INJECTION INTRAMUSCULAR; INTRAVENOUS AS NEEDED
Status: DISCONTINUED | OUTPATIENT
Start: 2018-02-20 | End: 2018-02-20 | Stop reason: HOSPADM

## 2018-02-20 RX ORDER — SODIUM CHLORIDE 0.9 % (FLUSH) 0.9 %
5-10 SYRINGE (ML) INJECTION EVERY 8 HOURS
Status: DISCONTINUED | OUTPATIENT
Start: 2018-02-20 | End: 2018-02-21 | Stop reason: HOSPADM

## 2018-02-20 RX ADMIN — Medication 5 ML: at 18:49

## 2018-02-20 RX ADMIN — KETAMINE HYDROCHLORIDE 10 MG: 50 INJECTION, SOLUTION INTRAMUSCULAR; INTRAVENOUS at 14:58

## 2018-02-20 RX ADMIN — NEOSTIGMINE METHYLSULFATE 3 MG: 1 INJECTION INTRAVENOUS at 15:35

## 2018-02-20 RX ADMIN — KETAMINE HYDROCHLORIDE 30 MG: 50 INJECTION, SOLUTION INTRAMUSCULAR; INTRAVENOUS at 14:09

## 2018-02-20 RX ADMIN — FENTANYL CITRATE 50 MCG: 50 INJECTION, SOLUTION INTRAMUSCULAR; INTRAVENOUS at 14:09

## 2018-02-20 RX ADMIN — WATER 1 G: 1 INJECTION INTRAMUSCULAR; INTRAVENOUS; SUBCUTANEOUS at 22:02

## 2018-02-20 RX ADMIN — Medication 2 G: at 14:15

## 2018-02-20 RX ADMIN — HYDROMORPHONE HYDROCHLORIDE 0.5 MG: 2 INJECTION, SOLUTION INTRAMUSCULAR; INTRAVENOUS; SUBCUTANEOUS at 16:30

## 2018-02-20 RX ADMIN — Medication 10 ML: at 22:04

## 2018-02-20 RX ADMIN — ROCURONIUM BROMIDE 10 MG: 10 INJECTION, SOLUTION INTRAVENOUS at 14:46

## 2018-02-20 RX ADMIN — ROCURONIUM BROMIDE 40 MG: 10 INJECTION, SOLUTION INTRAVENOUS at 14:10

## 2018-02-20 RX ADMIN — KETAMINE HYDROCHLORIDE 7 MG: 50 INJECTION, SOLUTION INTRAMUSCULAR; INTRAVENOUS at 15:22

## 2018-02-20 RX ADMIN — HYDROMORPHONE HYDROCHLORIDE 0.5 MG: 2 INJECTION, SOLUTION INTRAMUSCULAR; INTRAVENOUS; SUBCUTANEOUS at 16:25

## 2018-02-20 RX ADMIN — SODIUM CHLORIDE, SODIUM LACTATE, POTASSIUM CHLORIDE, AND CALCIUM CHLORIDE 50 ML/HR: 600; 310; 30; 20 INJECTION, SOLUTION INTRAVENOUS at 22:12

## 2018-02-20 RX ADMIN — GABAPENTIN 300 MG: 300 CAPSULE ORAL at 11:55

## 2018-02-20 RX ADMIN — PROPOFOL 120 MG: 10 INJECTION, EMULSION INTRAVENOUS at 14:09

## 2018-02-20 RX ADMIN — LIDOCAINE HYDROCHLORIDE ANHYDROUS AND DEXTROSE MONOHYDRATE 1 MG/MIN: .4; 5 INJECTION, SOLUTION INTRAVENOUS at 14:22

## 2018-02-20 RX ADMIN — FENTANYL CITRATE 25 MCG: 50 INJECTION, SOLUTION INTRAMUSCULAR; INTRAVENOUS at 15:04

## 2018-02-20 RX ADMIN — ZOLPIDEM TARTRATE 5 MG: 5 TABLET ORAL at 22:03

## 2018-02-20 RX ADMIN — GLYCOPYRROLATE 0.4 MG: 0.2 INJECTION INTRAMUSCULAR; INTRAVENOUS at 15:35

## 2018-02-20 RX ADMIN — HEPARIN SODIUM 5000 UNITS: 5000 INJECTION, SOLUTION INTRAVENOUS; SUBCUTANEOUS at 11:58

## 2018-02-20 RX ADMIN — SODIUM CHLORIDE, SODIUM LACTATE, POTASSIUM CHLORIDE, AND CALCIUM CHLORIDE 75 ML/HR: 600; 310; 30; 20 INJECTION, SOLUTION INTRAVENOUS at 11:52

## 2018-02-20 RX ADMIN — TRAMADOL HYDROCHLORIDE 50 MG: 50 TABLET, FILM COATED ORAL at 22:03

## 2018-02-20 RX ADMIN — LIDOCAINE HYDROCHLORIDE 40 MG: 20 INJECTION, SOLUTION EPIDURAL; INFILTRATION; INTRACAUDAL; PERINEURAL at 14:09

## 2018-02-20 RX ADMIN — CYCLOSPORINE 1 DROP: 0.5 EMULSION OPHTHALMIC at 21:00

## 2018-02-20 NOTE — IP AVS SNAPSHOT
[address] [facility] 
 
 
 [address] [facility] [address] [facility] 
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 
[phone] Prateek Leon MD  PATIENT HAS SCHEDULED APPOINTMENT [place] [address] [facility] [address] [facility] [address] 
[phone] Your Scheduled Appointments Thursday March 01, 2018  8:30 AM EST Follow Up with Prateek Leon MD  
[facility]) URIAH/ [place] [address] [facility] [address]  
[phone] Tuesday March 27, 2018  1:30 PM EDT  
OFFICE DEVICE CHECKS with [facility] 39 [facility] OFFICE ([address] [place] [address]) 2 Anastacia Sharma 
[address] Saroj Devi 81 [phone] This upcoming device check will take place IN OUR OFFICE. Please arrive 15 minutes early to review any necessary paperwork requirements. If you have any further questions or need to change this appointment, we are happy to help and can be reached at [phone]. Tuesday March 27, 2018  1:30 PM EDT RESEARCH with [address] [facility] OFFICE ([address] [place] [address]) 2 Anastacia Sharma 
[address] Saroj Devi 81  
[phone] Discharge Orders None A check maddi indicates which time of day the medication should be taken. My Medications CONTINUE taking these medications Instructions Each Dose to Equal  
 Morning Noon Evening Bedtime AMBIEN 10 mg tablet Generic drug:  zolpidem Your next dose is: This evening Take 5 mg by mouth nightly. 5 mg  
    
   
   
  
   
  
 amiodarone 100 mg tablet Commonly known as:  Lawence Ma Your next dose is:  Tomorrow Morning Take 1 Tab by mouth daily. 100 mg  
    
  
   
   
   
  
 amLODIPine 5 mg tablet Commonly known as:  Sonia Maldonado Your next dose is:  Tomorrow Morning Take 1 Tab by mouth daily. 5 mg  
    
  
   
   
   
  
 aspirin 81 mg chewable tablet Your next dose is:  Tomorrow Morning Take 1 Tab by mouth daily. 81 mg  
    
  
   
   
   
  
 B-12 PLUS SL  
   
 by SubLINGual route. cholecalciferol (VITAMIN D3) 5,000 unit Tab tablet Commonly known as:  VITAMIN D3 Your next dose is:  Tomorrow Morning Take  by mouth daily. CO Q-10 100 mg capsule Generic drug:  co-enzyme Q-10 Your next dose is:  Tomorrow Morning Take 100 mg by mouth daily. 100 mg DEXILANT 60 mg Cpdb Generic drug:  Dexlansoprazole Take  by mouth. DIETARY SUPPLEMENT PO Take  by mouth. Deplin DULoxetine 60 mg capsule Commonly known as:  CYMBALTA Your next dose is:  Tomorrow Morning Take 60 mg by mouth every morning. 60 mg  
    
  
   
   
   
  
 losartan 25 mg tablet Commonly known as:  COZAAR Your next dose is:  Tomorrow Morning Take 2 Tabs by mouth every morning. 50 mg  
    
  
   
   
   
  
 nitroglycerin 0.4 mg SL tablet Commonly known as:  NITROSTAT Your next dose is: Take on as needed schedule 1 Tab by SubLINGual route every five (5) minutes as needed for Chest Pain. 0.4 mg  
    
   
   
   
  
 OCUVITE WITH LUTEIN PO Take  by mouth. 25mg-5mg daily RESTASIS 0.05 % ophthalmic emulsion Generic drug:  cycloSPORINE Your next dose is: This evening Administer 1 Drop to both eyes two (2) times a day. 1 Drop  
    
  
   
   
  
   
  
 sucralfate 1 gram tablet Commonly known as:  Elnor Naldo Your next dose is: This afternoon Take 1 Tab by mouth four (4) times daily. 1 g  
    
  
   
  
   
  
   
  
  
 XANAX 0.25 mg tablet Generic drug:  ALPRAZolam  
Your next dose is: Take on as needed schedule Take 0.25 mg by mouth three (3) times daily as needed. 0.25 mg Discharge Instructions DISCHARGE SUMMARY from Nurse PATIENT INSTRUCTIONS: 
 
 
F-face looks uneven A-arms unable to move or move unevenly S-speech slurred or non-existent T-time-call 911 as soon as signs and symptoms begin-DO NOT go Back to bed or wait to see if you get better-TIME IS BRAIN. Warning Signs of HEART ATTACK Call 911 if you have these symptoms: 
? Chest discomfort. Most heart attacks involve discomfort in the center of the chest that lasts more than a few minutes, or that goes away and comes back. It can feel like uncomfortable pressure, squeezing, fullness, or pain. ? Discomfort in other areas of the upper body. Symptoms can include pain or discomfort in one or both arms, the back, neck, jaw, or stomach. ? Shortness of breath with or without chest discomfort. ? Other signs may include breaking out in a cold sweat, nausea, or lightheadedness. Don't wait more than five minutes to call [address]! Fast action can save your life. Calling 911 is almost always the fastest way to get lifesaving treatment. Emergency Medical Services staff can begin treatment when they arrive  up to an hour sooner than if someone gets to the hospital by car. The discharge information has been reviewed with the patient. The patient verbalized understanding. Discharge medications reviewed with the patient and appropriate educational materials and side effects teaching were provided. ___________________________________________________________________________________________________________________________________ Introducing [facility] [place] [facility]! Jannette Madrigal introduces [employer] patient portal. Now you can access parts of your medical record, email your doctor's office, and request medication refills online. 1. In your internet browser, go to [email or web address] 2. Click on the First Time User? Click Here link in the Sign In box. You will see the New Member Sign Up page. 3. Enter your [employer] Access Code exactly as it appears below. You will not need to use this code after youve completed the sign-up process. If you do not sign up before the expiration date, you must request a new code. · [employer] Access Code: [email or web address] Expires: 3/11/2018 11:08 AM 
 
4. Enter the last four digits of your Social Security Number (xxxx) and Date of Birth (mm/dd/yyyy) as indicated and click Submit. You will be taken to the next sign-up page. 5. Create a [employer] ID. This will be your [employer] login ID and cannot be changed, so think of one that is secure and easy to remember. 6. Create a [employer] password. You can change your password at any time. 7. Enter your Password Reset Question and Answer. This can be used at a later time if you forget your password. 8. Enter your e-mail address. You will receive e-mail notification when new information is available in [address] 9. Click Sign Up. You can now view and download portions of your medical record. 10. Click the Download Summary menu link to download a portable copy of your medical information. If you have questions, please visit the Frequently Asked Questions section of the MyChart website. Remember, [employer] is NOT to be used for urgent needs. For medical emergencies, dial 911. Now available from your iPhone and Android! Providers Seen During Your Hospitalization Provider Specialty Primary office phone Nisa Grullon MD Gynecologic Oncology [phone] Your Primary Care Physician (PCP) Primary Care Physician Office Phone Office Fax [address] [facility], [address] [place] [address] Road [phone] You are allergic to the following Allergen Reactions Latex Rash Lactose Diarrhea Adhesive Rash Atorvastatin Other (comments) Muscle aches Augmentin (Amoxicillin-Pot Clavulanate) Other (comments) GI Upset Codeine Hives Ezetimibe Other (comments) Muscle aches Fluoxetine Cough Percocet (Oxycodone-Acetaminophen) Hives Potassium Nausea Only Pravastatin Myalgia Prednisone Nausea and Vomiting Simvastatin Unknown (comments) Wellbutrin (Bupropion) Unknown (comments) Recent Documentation Height Weight BMI OB Status Smoking Status 1.702 m 60.2 kg 20.8 kg/m2 Postmenopausal Never Smoker Emergency Contacts Name Discharge Info Relation Home Work Mobile Mallory Forman DISCHARGE CAREGIVER [3] Sister [23] [phone] Patient Belongings The following personal items are in your possession at time of discharge: 
  Dental Appliances: None  Visual Aid: Glasses, Sent home      Home Medications: None   Jewelry: None  Clothing:  Footwear, Pants, Shirt, Undergarments    Other Valuables: Eyeglasses, Other (comment) (Hearing aids) Please provide this summary of care documentation to your next provider. Signatures-by signing, you are acknowledging that this After Visit Summary has been reviewed with you and you have received a copy. Patient Signature:  ____________________________________________________________ Date:  ____________________________________________________________  
  
Keyanna Dimitrios Provider Signature:  ____________________________________________________________ Date:  ____________________________________________________________

## 2018-02-20 NOTE — ANESTHESIA PREPROCEDURE EVALUATION
Anesthetic History   No history of anesthetic complications            Review of Systems / Medical History  Patient summary reviewed and pertinent labs reviewed    Pulmonary  Within defined limits                 Neuro/Psych   Within defined limits           Cardiovascular    Hypertension: well controlled      CHF (EF 30%)    CAD, PAD, cardiac stents (in 2008 and again in 12/2016) and hyperlipidemia    Exercise tolerance: <4 METS  Comments: LBBB   GI/Hepatic/Renal  Within defined limits             Comments: S/p hiatal hernia repair and no further issues with reflux Endo/Other        Arthritis     Other Findings              Physical Exam    Airway  Mallampati: II  TM Distance: > 6 cm  Neck ROM: normal range of motion   Mouth opening: Normal     Cardiovascular  Regular rate and rhythm,  S1 and S2 normal,  no murmur, click, rub, or gallop  Rhythm: regular  Rate: normal         Dental  No notable dental hx       Pulmonary  Breath sounds clear to auscultation               Abdominal  GI exam deferred       Other Findings            Anesthetic Plan    ASA: 3  Anesthesia type: general          Induction: Intravenous  Anesthetic plan and risks discussed with: Patient

## 2018-02-20 NOTE — ANESTHESIA POSTPROCEDURE EVALUATION
Post-Anesthesia Evaluation and Assessment    Patient: Bishnu Kearns MRN: [id number]  SSN: [id number]    YOB: 1935  Age: [de-identified] y.o. Sex: female       Cardiovascular Function/Vital Signs  Visit Vitals    /79    Pulse 80    Temp 36.4 °C (97.6 °F)    Resp 20    Ht 5' 7\" (1.702 m)    Wt 60.2 kg (132 lb 12.8 oz)    SpO2 95%    BMI 20.8 kg/m2       Patient is status post general anesthesia for Procedure(s):  LAPAROSCOPIC EXAM/BIOPSY RIGHT OVARY. Nausea/Vomiting: None    Postoperative hydration reviewed and adequate. Pain:  Pain Scale 1: Numeric (0 - 10) (02/20/18 1630)  Pain Intensity 1: 8 (02/20/18 1630)   Managed    Neurological Status:   Neuro (WDL): Exceptions to WDL (02/20/18 1603)  Neuro  Neurologic State: Drowsy (02/20/18 1603)  LUE Motor Response: Purposeful (02/20/18 1603)  LLE Motor Response: Purposeful (02/20/18 1603)  RUE Motor Response: Purposeful (02/20/18 1603)  RLE Motor Response: Purposeful (02/20/18 1603)   At baseline    Mental Status and Level of Consciousness: Arousable    Pulmonary Status:   O2 Device: Nasal cannula (02/20/18 1603)   Adequate oxygenation and airway patent    Complications related to anesthesia: None    Post-anesthesia assessment completed.  No concerns    Signed By: Mariam Lino MD     February 20, 2018

## 2018-02-20 NOTE — PROGRESS NOTES
TRANSFER - IN REPORT:    Verbal report received from [facility] RN on Yumiko Ponds  being received from PACU for routine post - op      Report consisted of patients Situation, Background, Assessment and   Recommendations(SBAR). Information from the following report(s) SBAR, Kardex, OR Summary, Intake/Output and MAR was reviewed with the receiving nurse. Opportunity for questions and clarification was provided. Assessment completed upon patients arrival to unit and care assumed.

## 2018-02-20 NOTE — BRIEF OP NOTE
BRIEF OPERATIVE NOTE    Date of Procedure: 2/20/2018   Preoperative Diagnosis: Pelvic mass in female [R19.00]  Postoperative Diagnosis: Pelvic mass in female [R19.00]    Procedure(s):  LAPAROSCOPY GYN DIAGNOSTIC WITH POSS BIOPSY/ POSSIBLE STAGGING  POSS HYSTERECTOMY ABDOMINAL TOTAL WITH BILATERAL SALPINGO OOPHORECTOMY  Surgeon(s) and Role:     * Polly Griffin MD - Primary         Assistant Staff: danny marte np      Surgical Staff:  Circ-1: Halbert Buerger, RN  Circ-Relief: Macarena Calabrese RN  Scrub Tech-1: James Filler  Scrub Tech-Relief: Betsey Mai  Event Time In   Incision Start 1430   Incision Close 1543     Anesthesia: General   Estimated Blood Loss: 10 ml  Specimens:   ID Type Source Tests Collected by Time Destination   1 : right ovarian tumor Frozen Section   Polly Griffin MD 2/20/2018 1512 Pathology   1 : ascites Fresh Peritoneal Fluid  Polly Griffin MD 2/20/2018 1448 Cytology      Findings: tumor with minimal attachment right ovary, vascular only, benign fs.removed in bag intact.   Complications: none  Implants: * No implants in log *

## 2018-02-20 NOTE — PROGRESS NOTES
made pre-op prayer visit. Pt's sister was present for visit. Pt was alert and verbal.  Pt appeared comfortable. No pain level was expressed or observed.  provided spiritual care through presence, pastoral conversation, prayer, and assurance of prayer.

## 2018-02-21 VITALS
SYSTOLIC BLOOD PRESSURE: 180 MMHG | RESPIRATION RATE: 19 BRPM | HEART RATE: 79 BPM | WEIGHT: 132.8 LBS | DIASTOLIC BLOOD PRESSURE: 68 MMHG | HEIGHT: 67 IN | OXYGEN SATURATION: 97 % | TEMPERATURE: 97.9 F | BODY MASS INDEX: 20.84 KG/M2

## 2018-02-21 LAB
ANION GAP SERPL CALC-SCNC: 9 MMOL/L (ref 7–16)
BASOPHILS # BLD: 0 K/UL (ref 0–0.2)
BASOPHILS NFR BLD: 0 % (ref 0–2)
BUN SERPL-MCNC: 12 MG/DL (ref 8–23)
CALCIUM SERPL-MCNC: 9.3 MG/DL (ref 8.3–10.4)
CHLORIDE SERPL-SCNC: 100 MMOL/L (ref 98–107)
CO2 SERPL-SCNC: 28 MMOL/L (ref 21–32)
CREAT SERPL-MCNC: 0.84 MG/DL (ref 0.6–1)
DIFFERENTIAL METHOD BLD: ABNORMAL
EOSINOPHIL # BLD: 0 K/UL (ref 0–0.8)
EOSINOPHIL NFR BLD: 0 % (ref 0.5–7.8)
ERYTHROCYTE [DISTWIDTH] IN BLOOD BY AUTOMATED COUNT: 14.1 % (ref 11.9–14.6)
GLUCOSE SERPL-MCNC: 103 MG/DL (ref 65–100)
HCT VFR BLD AUTO: 41.5 % (ref 35.8–46.3)
HGB BLD-MCNC: 13.5 G/DL (ref 11.7–15.4)
IMM GRANULOCYTES # BLD: 0 K/UL (ref 0–0.5)
IMM GRANULOCYTES NFR BLD AUTO: 0 % (ref 0–5)
LYMPHOCYTES # BLD: 1.2 K/UL (ref 0.5–4.6)
LYMPHOCYTES NFR BLD: 11 % (ref 13–44)
MCH RBC QN AUTO: 30.3 PG (ref 26.1–32.9)
MCHC RBC AUTO-ENTMCNC: 32.5 G/DL (ref 31.4–35)
MCV RBC AUTO: 93 FL (ref 79.6–97.8)
MONOCYTES # BLD: 1.1 K/UL (ref 0.1–1.3)
MONOCYTES NFR BLD: 11 % (ref 4–12)
NEUTS SEG # BLD: 8.4 K/UL (ref 1.7–8.2)
NEUTS SEG NFR BLD: 78 % (ref 43–78)
PLATELET # BLD AUTO: 216 K/UL (ref 150–450)
PMV BLD AUTO: 10.1 FL (ref 10.8–14.1)
POTASSIUM SERPL-SCNC: 3.8 MMOL/L (ref 3.5–5.1)
RBC # BLD AUTO: 4.46 M/UL (ref 4.05–5.25)
SODIUM SERPL-SCNC: 137 MMOL/L (ref 136–145)
WBC # BLD AUTO: 10.7 K/UL (ref 4.3–11.1)

## 2018-02-21 PROCEDURE — 74011250636 HC RX REV CODE- 250/636: Performed by: OBSTETRICS & GYNECOLOGY

## 2018-02-21 PROCEDURE — 80048 BASIC METABOLIC PNL TOTAL CA: CPT | Performed by: OBSTETRICS & GYNECOLOGY

## 2018-02-21 PROCEDURE — 74011250637 HC RX REV CODE- 250/637: Performed by: OBSTETRICS & GYNECOLOGY

## 2018-02-21 PROCEDURE — 74011000250 HC RX REV CODE- 250: Performed by: OBSTETRICS & GYNECOLOGY

## 2018-02-21 PROCEDURE — 85025 COMPLETE CBC W/AUTO DIFF WBC: CPT | Performed by: OBSTETRICS & GYNECOLOGY

## 2018-02-21 PROCEDURE — 36415 COLL VENOUS BLD VENIPUNCTURE: CPT | Performed by: OBSTETRICS & GYNECOLOGY

## 2018-02-21 PROCEDURE — 99218 HC RM OBSERVATION: CPT

## 2018-02-21 RX ADMIN — WATER 1 G: 1 INJECTION INTRAMUSCULAR; INTRAVENOUS; SUBCUTANEOUS at 06:02

## 2018-02-21 RX ADMIN — STANDARDIZED SENNA CONCENTRATE AND DOCUSATE SODIUM 2 TABLET: 8.6; 5 TABLET, FILM COATED ORAL at 07:43

## 2018-02-21 RX ADMIN — AMIODARONE HYDROCHLORIDE 100 MG: 200 TABLET ORAL at 07:48

## 2018-02-21 RX ADMIN — ASPIRIN 81 MG 81 MG: 81 TABLET ORAL at 07:47

## 2018-02-21 RX ADMIN — Medication 5 ML: at 06:03

## 2018-02-21 RX ADMIN — LOSARTAN POTASSIUM 50 MG: 50 TABLET ORAL at 06:02

## 2018-02-21 RX ADMIN — DULOXETINE HYDROCHLORIDE 60 MG: 60 CAPSULE, DELAYED RELEASE ORAL at 06:02

## 2018-02-21 RX ADMIN — PANTOPRAZOLE SODIUM 40 MG: 40 TABLET, DELAYED RELEASE ORAL at 07:46

## 2018-02-21 RX ADMIN — ACETAMINOPHEN 325 MG: 325 TABLET ORAL at 07:52

## 2018-02-21 RX ADMIN — TRAMADOL HYDROCHLORIDE 50 MG: 50 TABLET, FILM COATED ORAL at 04:08

## 2018-02-21 RX ADMIN — AMLODIPINE BESYLATE 5 MG: 5 TABLET ORAL at 07:47

## 2018-02-21 NOTE — PROGRESS NOTES
Discharge instructions and prescriptions provided and explained to patient, patient voiced understanding. Medication side effect sheet reviewed with pt. No home meds or valuables to return. Opportunity for questions provided. Pt's ride (sister) will not be here until noon. Instructed to call once ready to leave the floor.

## 2018-02-21 NOTE — PROGRESS NOTES
José Antonio Primus  Admission Date: 2/20/2018               Daily Progress Note: 2/21/2018    LAB  Recent Labs      02/21/18   0501   WBC  10.7   HGB  13.5   HCT  41.5   PLT  216         Visit Vitals    /75    Pulse 81    Temp 97.8 °F (36.6 °C)    Resp 18    Ht 5' 7\" (1.702 m)    Wt 132 lb 12.8 oz (60.2 kg)    SpO2 96%    BMI 20.8 kg/m2       Current Facility-Administered Medications   Medication Dose Route Frequency Provider Last Rate Last Dose    ALPRAZolam Venia Naegeli) tablet 0.25 mg  0.25 mg Oral QID PRN Dwain Vallejo MD        amiodarone (CORDARONE) tablet 100 mg  100 mg Oral DAILY Dwain Vallejo MD        amLODIPine (NORVASC) tablet 5 mg  5 mg Oral DAILY Dwain Vallejo MD        aspirin chewable tablet 81 mg  81 mg Oral DAILY Dwain Vallejo MD        pantoprazole (PROTONIX) tablet 40 mg  40 mg Oral DAILY Dwain Vallejo MD        DULoxetine (CYMBALTA) capsule 60 mg  60 mg Oral 7am Dwain Vallejo MD   60 mg at 02/21/18 0602    losartan (COZAAR) tablet 50 mg  50 mg Oral 7am Dwain Vallejo MD   50 mg at 02/21/18 0602    nitroglycerin (NITROSTAT) tablet 0.4 mg  0.4 mg SubLINGual Q5MIN PRN Dwain Vallejo MD        sucralfate (CARAFATE) tablet 1 g  1 g Oral QID Dwain Vallejo MD   Stopped at 02/20/18 1800    zolpidem (AMBIEN) tablet 5 mg  5 mg Oral QHS Dwain Vallejo MD   5 mg at 02/20/18 2203    sodium chloride (NS) flush 5-10 mL  5-10 mL IntraVENous Q8H Dwain Vallejo MD   5 mL at 02/21/18 0603    sodium chloride (NS) flush 5-10 mL  5-10 mL IntraVENous PRN Dwain Vallejo MD        ibuprofen (MOTRIN) tablet 200 mg  200 mg Oral Q4H PRN Dwain Vallejo MD        oxyCODONE IR (ROXICODONE) tablet 5 mg  5 mg Oral Q4H PRN Dwain Vallejo MD        naloxone [facility]) injection 0.4 mg  0.4 mg IntraVENous PRN Dwain Vallejo MD        ondansetron (ZOFRAN ODT) tablet 4 mg  4 mg Oral Q4H PRN Dwain Vallejo MD        diphenhydrAMINE (BENADRYL) injection 12.5 mg  12.5 mg IntraVENous Q4H PRN Dwain Vallejo MD  senna-docusate (PERICOLACE) 8.6-50 mg per tablet 2 Tab  2 Tab Oral DAILY Darwin Huang MD        prochlorperazine (COMPAZINE) injection 5 mg  5 mg IntraVENous Q6H PRN Darwin Huang MD        traMADol [place]) tablet 50 mg  50 mg Oral Q6H PRN Darwin Huang MD   50 mg at 02/21/18 0408    acetaminophen (TYLENOL) tablet 325 mg  325 mg Oral Q6H PRN Darwin Huang MD        cycloSPORINE (RESTASIS) 0.05 % ophthalmic emulsion 1 Drop (Patient Supplied)  1 Drop Both Eyes Q12H Darwin Huang MD   1 Drop at 02/20/18 2100       Allergies   Allergen Reactions    Latex Rash    Lactose Diarrhea    Adhesive Rash    Atorvastatin Other (comments)     Muscle aches    Augmentin [Amoxicillin-Pot Clavulanate] Other (comments)     GI Upset      Codeine Hives    Ezetimibe Other (comments)     Muscle aches    Fluoxetine Cough    Percocet [Oxycodone-Acetaminophen] Hives    Potassium Nausea Only    Pravastatin Myalgia    Prednisone Nausea and Vomiting    Simvastatin Unknown (comments)    Wellbutrin [Bupropion] Unknown (comments)       NOTE  No complaitna  aox3 nad  rrr  ctab  abd soft   incs cdi  Pod 1  dc    Darwin Huang MD  2/21/2018

## 2018-02-21 NOTE — OP NOTES
[facility] REPORT    Ramy Lynch  MR#: [id number]  : 1935  ACCOUNT #: [de-identified]   DATE OF SERVICE: 2018    PREOPERATIVE DIAGNOSIS:  Pelvic mass. POSTOPERATIVE DIAGNOSIS:  Benign ovarian neoplasm, right. PROCEDURE PERFORMED:  Laparoscopic exam with right ovarian biopsy of large mass with removal of mass. SURGEON:  Marty Howe MD    ASSISTANT:  Mariusz    ANESTHESIA:  General.    ESTIMATED BLOOD LOSS:  10 mL. PACKS:  None. DRAINS:  Straight Rojas catheter discontinued prior to extubation. COMPLICATIONS:  None. DISPOSITION:  PACU. INDICATIONS:  The patient was referred after incidental finding of a pelvic mass. Risks, benefits and alternatives, morbidities and mortalities were reviewed and she wished to proceed. FINDINGS:  Upper abdomen appeared normal.  Appendix appeared normal.  Bowel appeared normal.  Left adnexa appeared normal.  Uterus appeared normal.  Right tube appeared normal.  The right ovary appeared atrophic with a tumor hanging off of it, approximately 15 cm in size, solid with a vascular pedicle, unattached to any other organs. PROCEDURE:  The patient was taken to the operating room and placed under general anesthesia, sterilely prepped and draped. Rojas catheter placed. Open laparoscopic technique used to obtain entry. A port was placed paraumbilical.  There was some ascites noted. This was removed and sent for cytology. Findings were otherwise as above. Further 5 mm trocars were both placed under direct visualization, one midline lower suprapubic and one left lateral.  The vascular pedicle of ovarian mass was isolated, grasped with the LigaSure, cauterized and transected, freeing up the tumor. Subsequently, a large Endobag was placed and the tumor was placed within the bag. The bag was closed. The tumor was then removed intact through the expanded periumbilical incision in the bag.   Once this was completed, that incision was closed with PDS at the fascia, subcutaneous was reapproximated and then the subcuticular skin placed. Frozen section came back benign and as per preoperative discussion, the procedure was completed at this point. The other two trocars were removed after hemostasis confirmed at the operative site and insufflation allowed to resolve. Subcuticulars were placed on these and then Dermabond placed on all incisions. The Rojas was removed. Sponge, lap and needle counts were correct. The patient tolerated the procedure well and was taken to PACU stable.       MD Will Esteban / Elisa.Friends  D: 02/20/2018 15:56     T: 02/20/2018 16:38  JOB #: [id number]

## 2018-02-21 NOTE — PROGRESS NOTES
END OF SHIFT NOTE:    INTAKE/OUTPUT  02/20 0701 - 02/21 0700  In: 2769 [I.V.:2769]  Out: 590 [Urine:590]  Voiding: YES  Catheter: NO  Drain:              Flatus: Patient does not have flatus present. Stool:  0 occurrences. Characteristics:       Emesis: 0 occurrences. Characteristics:        VITAL SIGNS  Patient Vitals for the past 12 hrs:   Temp Pulse Resp BP SpO2   02/21/18 0305 97.8 °F (36.6 °C) 81 18 165/75 96 %   02/20/18 2320 97.5 °F (36.4 °C) 85 18 162/63 92 %   02/20/18 1920 97.7 °F (36.5 °C) 82 18 183/70 91 %       Pain Assessment  Pain Intensity 1: 0 (02/21/18 0134)  Pain Location 1: Abdomen  Pain Intervention(s) 1: Medication (see MAR)  Patient Stated Pain Goal: 3    Ambulating  Yes    Shift report to be given to oncoming nurse at the bedside.     Sirena Wilkinson RN

## 2018-02-21 NOTE — DISCHARGE SUMMARY
10 Jann Piper  MR#: [id number]  : 1935  ACCOUNT #: [de-identified]   ADMIT DATE: 2018  DISCHARGE DATE:     DISCHARGE DIAGNOSIS:  Right ovarian tumor, benign, stromal.    HOSPITAL COURSE:  Admitted for same day surgery, underwent a laparoscopic exam with resection of right ovarian tumor. The patient did well postoperatively, discharged in good condition after instructions, precautions, intraoperative findings reviewed. DISPOSITION:  Home. MEDICATIONS:  EMR. FOLLOWUP:  Follow up in 1 week.       MD ADDIE Valdovinos/GALINA  D: 2018 07:20     T: 2018 09:04  JOB #: [id number]

## 2018-02-21 NOTE — PROGRESS NOTES
Patient voided 350 ml of urine. The post void bladder scan showed 218 ml of urine. Explained to the patient what the order said and asked the patient if it was ok to insert a davey catheter. The patient stated \"I don't want the catheter back in me\". Will notify oncoming nurse.

## 2018-02-21 NOTE — PROGRESS NOTES
Pt admitted to unit. Drowsy but awakens and answers all questions appropriately. [place]. Oriented to room and call system. Instructed to call with any needs. Call light in hand. Instructed not to get OOB without assistance. Verbalized understanding. Will instruct on IS when restocked.

## 2018-07-06 ENCOUNTER — HOSPITAL ENCOUNTER (OUTPATIENT)
Dept: CT IMAGING | Age: 83
Discharge: HOME | End: 2018-07-06
Attending: PSYCHIATRY & NEUROLOGY
Payer: MEDICARE

## 2018-07-06 VITALS — WEIGHT: 152 LBS | BODY MASS INDEX: 23.86 KG/M2 | HEIGHT: 67 IN

## 2018-07-06 DIAGNOSIS — M48.00 SPINAL STENOSIS, SITE UNSPECIFIED: ICD-10-CM

## 2018-07-06 DIAGNOSIS — M31.6 OTHER GIANT CELL ARTERITIS (HCC): ICD-10-CM

## 2018-07-06 DIAGNOSIS — G20 PARKINSON'S DISEASE (HCC): ICD-10-CM

## 2018-07-06 LAB — CREAT BLD-MCNC: 1 MG/DL (ref 0.8–1.5)

## 2018-07-06 PROCEDURE — 74011636320 HC RX REV CODE- 636/320

## 2018-07-06 PROCEDURE — 74011000258 HC RX REV CODE- 258

## 2018-07-06 PROCEDURE — 82565 ASSAY OF CREATININE: CPT

## 2018-07-06 PROCEDURE — 70498 CT ANGIOGRAPHY NECK: CPT

## 2018-07-06 RX ORDER — SODIUM CHLORIDE 0.9 % (FLUSH) 0.9 %
10 SYRINGE (ML) INJECTION
Status: COMPLETED | OUTPATIENT
Start: 2018-07-06 | End: 2018-07-06

## 2018-07-06 RX ADMIN — SODIUM CHLORIDE 100 ML: 900 INJECTION, SOLUTION INTRAVENOUS at 10:43

## 2018-07-06 RX ADMIN — IOPAMIDOL 80 ML: 755 INJECTION, SOLUTION INTRAVENOUS at 10:42

## 2018-07-06 RX ADMIN — Medication 10 ML: at 10:43

## 2018-10-10 NOTE — THERAPY EVALUATION
Lisa Nunez  : [id number]  [place]  Secondary: Jb Germain at [address], [address], [facility]  [phone]   [phone]          OUTPATIENT PHYSICAL THERAPY:Initial Assessment 10/10/2018   ICD-10: Treatment Diagnosis: Low back pain (M54.5)  Precautions/Allergies:   Latex; Lactose; Adhesive; Atorvastatin; Augmentin [amoxicillin-pot clavulanate]; Codeine; Ezetimibe; Fluoxetine; Percocet [oxycodone-acetaminophen]; Potassium; Pravastatin; Prednisone; Simvastatin; and Wellbutrin [bupropion]   Fall Risk Score: 1 (? 5 = High Risk)  MD Orders: Evaluate and treat, pool/water aquatics for lower back pain MEDICAL/REFERRING DIAGNOSIS:  back   DATE OF ONSET: Chronic condition  REFERRING PHYSICIAN: Rona Lozano, *  RETURN PHYSICIAN APPOINTMENT: TBD     INITIAL ASSESSMENT:  Ms. Aida Peabody presents with pain in many body regions, with referral to aquatic therapy due to recalcitrant low back pain. Patient demonstrates limited lumbar range of motion, impaired posture, decreased strength, altered gait mechanics and a generalized reduction in physical functioning. Patient is likely to benefit from continued PT to reduce low back pain and improve her overall physical functioning. PROBLEM LIST (Impacting functional limitations):  1. Decreased Strength  2. Decreased Ambulation Ability/Technique  3. Increased Pain  4. Decreased Activity Tolerance  5. Decreased Flexibility/Joint Mobility  6. Decreased [place] with Home Exercise Program INTERVENTIONS PLANNED:  1. Home Exercise Program (HEP)  2. Manual Therapy  3. Neuromuscular Re-education/Strengthening  4. Range of Motion (ROM)  5. Therapeutic Activites  6. Therapeutic Exercise/Strengthening  7. Aquatic therapy   TREATMENT PLAN:  Effective Dates: 10/10/2018 TO 2019.   Frequency/Duration: 2 visits per week for 90 Days  GOALS: (Goals have been discussed and agreed upon with patient.)  Short-Term Functional Goals: Time Frame: 45 days  1. Patient will report 0-1/10 low back with normalized activities and improve score on Oswestry to <35% disability  2. Patient will be able to tolerate 45 minutes of aquatic physical therapy  3. Patient will be independent with HEP  Discharge Goals: Time Frame: 90 days  1. Patient will be able to ambulate >20 minutes without having to stop due to low back pain  2. Patient will have 5/5 hip abduction strength  3. Patient will report a subjective 50% improvement in overall function    Rehabilitation Potential For Stated Goals: FAIR    Regarding Lisa Nunez's therapy, I certify that the treatment plan above will be carried out by a therapist or under their direction. Thank you for this referral,      Jax Inman, PT       Referring Physician Signature:     Aditi Rosas., *              Date                    The information in this section was collected on 10-10-18 (except where otherwise noted). HISTORY:   History of Present Injury/Illness (Reason for Referral):  Patient reports a long history of lower back pain, and underwent surgery for a \"clean up\" 4 or 5 years ago. She reports that this helped initially, but has since not controlled the pain, and she has been receiving steroid injections, which have not been working. She has also received chiropractic treatment and sees a massage therapist to address her pain. She had a R total hip replacement 3-4 years ago, and underwent a R knee arthroscopy in 2008. Had an ICD implanted in 2017. Past Medical History/Comorbidities:   Ms. Maximus Mckinney  has a past medical history of Anemia; Anxiety; Arthritis; Bundle branch block, left; CAD (coronary artery disease) (2004); Chicken pox; Chronic systolic CH[facility]est[facility] failure) [facility] (3/13/2017); Claustrophobia; Depression; Diverticulitis; Elevated cholesterol; H/O: whooping cough; Hiatal hernia; Hypertension; Measles;  Mumps; NSAID sensitivity; Post-menopausal; PUD (peptic ulcer disease); and Pyloric ulcer associated with Helicobacter pylori (6146). She also has no past medical history of Autoimmune disease ([address] Chronic kidney disease; Chronic pain; Difficult intubation; Malignant hyperthermia due to anesthesia; Nausea & vomiting; Pseudocholinesterase deficiency; Seizures ([address] or Thyroid disease. Ms. Angel Person  has a past surgical history that includes hx cataract removal (Bilateral, 2011); hx knee arthroscopy (Right, 2007); hx hip replacement (Right); hx hernia repair (2011); pr neurological procedure unlisted; hx breast biopsy (Left); pr left heart cath,percutaneous (2010/2004); hx heart catheterization (2016); hx other surgical (03/2017); hx pacemaker; and hx gyn (Right). Social History/Living Environment:    Patient lives alone in [address] [place] [address] in a [address] [facility] home. She has one small step to enter house, which is one level. Prior Level of Function/Work/Activity:  Patient is retired. Does use exercise equipment at [facility] on occasion. States that she typically uses a cane when ambulating at home. Current Medications:  Per attached handout at initial evaluation on 10-10-18     Current Outpatient Prescriptions:      Amiodarone  mg (1/2 tablet in morning)  - Amlodipine Besylate 5 MG tablet in morning  - Duloxetine HCL 60 MG cap (1 morning capsule)  - Hydrochlorothiazide 12.5 MG tablet (1 in the morning)  - Losartan postassium 25 mg tablet (2 in AM)  -  Olopatadine HCL 0.2% eye drop (1 in AM)  - Restasis multidose 0.05% eye   - Rexulti 1 MG tablet (1/2 in PM)  - Duloxetine HCL 30 MG cap (1 per day)  - Zolpidem tartrate 5 MG tablet (1 per day)   Date Last Reviewed:  10-10-18   Number of Personal Factors/Comorbidities that affect the Plan of Care: 1-2: MODERATE COMPLEXITY   EXAMINATION:   10-10-18    Observation/Orthostatic Postural Assessment:          Significant forward head posture with kyphotic posture.  Ambulates with forward trunk lean and reduced gait speed with small steps bilaterally. Palpation:          Tenderness and tightness to palpation of R lumbar paraspinals  ROM:          Lumbar flexion reduced by 50% with forward bending, and back bending 75-80% reduced actively. Limited hip extension during gait bilaterally. Strength:          Hip flexion 5/5 B        Hip abduction 4/5 B        Knee extension 5/5 B        Knee flexion 5/5 B        Ankle DF 5/5 B  Special Tests:          None  Neurological Screen:       Sensation to light touch intact bilaterally along all B LE dermatomes  Functional Mobility:         Sit to stands: uses hands to push up from seated position        Gait: Patient ambulates with reduced gait speed, with reduced step length bilaterally, and reduced hip extension bilaterally. Patient is    Body Structures Involved:  1. Nerves  2. Bones  3. Joints  4. Muscles Body Functions Affected:  1. Sensory/Pain  2. Neuromusculoskeletal  3. Movement Related Activities and Participation Affected:  1. Mobility  2. Self Care  3. Community, Social and [place]   Number of elements (examined above) that affect the Plan of Care: 4+: HIGH COMPLEXITY   CLINICAL PRESENTATION:   Presentation: Stable and uncomplicated: LOW COMPLEXITY   CLINICAL DECISION MAKING:   Outcome Measure: Tool Used: Modified Oswestry Low Back Pain Questionnaire  Score:  Initial: 12 (only completed front page)/50  Most Recent: X/50 (Date: -- )   Interpretation of Score: Each section is scored on a 0-5 scale, 5 representing the greatest disability. The scores of each section are added together for a total score of 50. Score 0 1-10 11-20 21-30 31-40 41-49 50   Modifier CH CI CJ CK CL CM CN     ?  Mobility - Walking and Moving Around:     - CURRENT STATUS: CK - 40%-59% impaired, limited or restricted    - GOAL STATUS: CJ - 20%-39% impaired, limited or restricted    - D/C STATUS:  ---------------To be determined---------------    Medical Necessity:   · Skilled intervention continues to be required due to low back pain and difficulty with walking for prolonged distances. Reason for Services/Other Comments:  · Patient continues to require skilled intervention due to low back pain. Use of outcome tool(s) and clinical judgement create a POC that gives a: Clear prediction of patient's progress: LOW COMPLEXITY            TREATMENT:   (In addition to Assessment/Re-Assessment sessions the following treatments were rendered)  Pre-treatment Symptoms/Complaints:  Reports having generalized weakness, and that she has to use her hands to stand. Has difficulty standing from low, soft chair due to LE weakness. Reports she is unable to walk for greater manjit 15 minutes without having to sit due to a combination of fatigue and low back pain. Patient is unable to lay on her stomach or R side (for prolonged periods) due to the ICD. Pain: Initial:     No VAS Post Session:  No back pain after PT     THERAPEUTIC EXERCISE (14 minutes) for reduced low back pain. Performed 2 x 10 of posterior pelvic tilts, single knee-to-chest stretch, with instructions to perform very cautiously on R LE due to presence of R total hip, and posterior pelvic tilt with small bridge in order to decrease lower back pain, strengthen patient's hips and facilitate lumbar flexion to reduce discomfort. HEP:  Patient provided with handout with written instructions and pictures on performance of the exercises above, with instructions not to bring R hip beyond 90 degrees due to R MANJIT. Patient verbalized understanding. Treatment/Session Assessment:    · Response to Treatment:  Patient tolerated exercises well. Demonstrates reduced overall movement speed and significant postural deficits. · Compliance with Program/Exercises: Will assess as treatment progresses  · Recommendations/Intent for next treatment session:  \"Next visit will focus on aquatic physical therapy.   Total Treatment Duration: 45 minutes  PT Patient Time In/Time Out  Time In: 1400  Time Out: [facility],

## 2018-10-10 NOTE — PROGRESS NOTES
Ambulatory/Rehab Services H2 Model Falls Risk Assessment    Risk Factor Pts. ·   Confusion/Disorientation/Impulsivity  []    4 ·   Symptomatic Depression  []   2 ·   Altered Elimination  []   1 ·   Dizziness/Vertigo  []   1 ·   Gender (Male)  []   1 ·   Any administered antiepileptics (anticonvulsants):  []   2 ·   Any administered benzodiazepines:  []   1 ·   Visual Impairment (specify):  []   1 ·   Portable Oxygen Use  []   1 ·   Orthostatic ? BP  []   1 ·   History of Recent Falls (within 3 mos.)  []   5     Ability to Rise from Chair (choose one) Pts. ·   Ability to rise in a single movement  []   0 ·   Pushes up, successful in one attempt  [x]   1 ·   Multiple attempts, but successful  []   3 ·   Unable to rise without assistance  []   4   Total: (5 or greater = High Risk) 1     Falls Prevention Plan:   []                Physical Limitations to Exercise (specify):   [x]                Mobility Assistance Device (type): uses cane   []                Exercise/Equipment Adaptation (specify):    ©2010 [facility] [address] [place] [address] States Patent #[id number].  Federal Law prohibits the replication, distribution or use without written permission from [facility] [employer]

## 2018-10-23 NOTE — THERAPY EVALUATION
Lisa Nunez  : [id number]  [place]  Secondary: Jb Germain at [address], [address], [place] [address].  [phone]   [phone]          OUTPATIENT PHYSICAL [address] and Aquatic 10/18/2018   ICD-10: Treatment Diagnosis: Low back pain (M54.5)  Precautions/Allergies:   Latex; Lactose; Adhesive; Atorvastatin; Augmentin [amoxicillin-pot clavulanate]; Codeine; Ezetimibe; Fluoxetine; Percocet [oxycodone-acetaminophen]; Potassium; Pravastatin; Prednisone; Simvastatin; and Wellbutrin [bupropion]   Fall Risk Score: 1 (? 5 = High Risk)  MD Orders: Evaluate and treat, pool/water aquatics for lower back pain MEDICAL/REFERRING DIAGNOSIS:  back   DATE OF ONSET: Chronic condition  REFERRING PHYSICIAN: Shayne Seo, *  RETURN PHYSICIAN APPOINTMENT: TBD     INITIAL ASSESSMENT:  Ms. Samreen Cohen presents with pain in many body regions, with referral to aquatic therapy due to recalcitrant low back pain. Patient demonstrates limited lumbar range of motion, impaired posture, decreased strength, altered gait mechanics and a generalized reduction in physical functioning. Patient is likely to benefit from continued PT to reduce low back pain and improve her overall physical functioning. PROBLEM LIST (Impacting functional limitations):  1. Decreased Strength  2. Decreased Ambulation Ability/Technique  3. Increased Pain  4. Decreased Activity Tolerance  5. Decreased Flexibility/Joint Mobility  6. Decreased [place] with Home Exercise Program INTERVENTIONS PLANNED:  1. Home Exercise Program (HEP)  2. Manual Therapy  3. Neuromuscular Re-education/Strengthening  4. Range of Motion (ROM)  5. Therapeutic Activites  6. Therapeutic Exercise/Strengthening  7. Aquatic therapy   TREATMENT PLAN:  Effective Dates: 10/10/2018 TO 2019.   Frequency/Duration: 2 visits per week for 90 Days  GOALS: (Goals have been discussed and agreed upon with patient.)  Short-Term Functional Goals: Time Frame: 45 days  1. Patient will report 0-1/10 low back with normalized activities and improve score on Oswestry to <35% disability  2. Patient will be able to tolerate 45 minutes of aquatic physical therapy  3. Patient will be independent with HEP  Discharge Goals: Time Frame: 90 days  1. Patient will be able to ambulate >20 minutes without having to stop due to low back pain  2. Patient will have 5/5 hip abduction strength  3. Patient will report a subjective 50% improvement in overall function    Rehabilitation Potential For Stated Goals: FAIR                The information in this section was collected on 10-10-18 (except where otherwise noted). HISTORY:   History of Present Injury/Illness (Reason for Referral):  Patient reports a long history of lower back pain, and underwent surgery for a \"clean up\" 4 or 5 years ago. She reports that this helped initially, but has since not controlled the pain, and she has been receiving steroid injections, which have not been working. She has also received chiropractic treatment and sees a massage therapist to address her pain. She had a R total hip replacement 3-4 years ago, and underwent a R knee arthroscopy in 2008. Had an ICD implanted in 2017. Past Medical History/Comorbidities:   Ms. Aida Peabody  has a past medical history of Anemia, Anxiety, Arthritis, Bundle branch block, left, CAD (coronary artery disease), Chicken pox, Chronic systolic CHF (congestive heart failure) ([facility] Claustrophobia, Depression, Diverticulitis, Elevated cholesterol, H/O: whooping cough, Hiatal hernia, Hypertension, Measles, Mumps, NSAID sensitivity, Post-menopausal, PUD (peptic ulcer disease), and Pyloric ulcer associated with Helicobacter pylori.   Ms. Aida Peabody  has a past surgical history that includes hx cataract removal (Bilateral, 2011); hx knee arthroscopy (Right, 2007); hx hip replacement (Right); hx hernia repair (2011); pr neurological procedure unlisted; hx breast biopsy (Left); pr left heart cath,percutaneous (2010/2004); hx heart catheterization (2016); hx other surgical (03/2017); hx pacemaker; hx gyn (Right); LAPAROSCOPIC EXAM/BIOPSY RIGHT OVARY (N/A, 2/20/2018); BIV ICD (N/A, 3/17/2017); RIGHT L4-5 ARABELLA LAMINECTOMY (Right, 1/6/2016); HIP ARTHROPLASTY TOTAL (Right, 2/9/4118); NISSEN FUNDOPLICATION LAPAROSCOPIC (N/A, 9/16/2011); HERNIA HIATAL REPAIR LAPAROSCOPIC (N/A, 9/16/2011); and GASTROSTOMY TUBE INSERTION LAPAROSCOPIC (N/A, 9/16/2011). Social History/Living Environment:    Patient lives alone in [address] [place] [address] in a [address] [facility] home. She has one small step to enter house, which is one level. Prior Level of Function/Work/Activity:  Patient is retired. Does use exercise equipment at [facility] on occasion. States that she typically uses a cane when ambulating at home. Current Medications:  Per attached handout at initial evaluation on 10-10-18     Current Outpatient Prescriptions:      Amiodarone  mg (1/2 tablet in morning)  - Amlodipine Besylate 5 MG tablet in morning  - Duloxetine HCL 60 MG cap (1 morning capsule)  - Hydrochlorothiazide 12.5 MG tablet (1 in the morning)  - Losartan postassium 25 mg tablet (2 in AM)  -  Olopatadine HCL 0.2% eye drop (1 in AM)  - Restasis multidose 0.05% eye   - Rexulti 1 MG tablet (1/2 in PM)  - Duloxetine HCL 30 MG cap (1 per day)  - Zolpidem tartrate 5 MG tablet (1 per day)   Date Last Reviewed:  10-10-18   Number of Personal Factors/Comorbidities that affect the Plan of Care: 1-2: MODERATE COMPLEXITY   EXAMINATION:   10-10-18    Observation/Orthostatic Postural Assessment:          Significant forward head posture with kyphotic posture. Ambulates with forward trunk lean and reduced gait speed with small steps bilaterally.   Palpation:          Tenderness and tightness to palpation of R lumbar paraspinals  ROM:          Lumbar flexion reduced by 50% with forward bending, and back bending 75-80% reduced actively. Limited hip extension during gait bilaterally. Strength:          Hip flexion 5/5 B        Hip abduction 4/5 B        Knee extension 5/5 B        Knee flexion 5/5 B        Ankle DF 5/5 B  Special Tests:          None  Neurological Screen:       Sensation to light touch intact bilaterally along all B LE dermatomes  Functional Mobility:         Sit to stands: uses hands to push up from seated position        Gait: Patient ambulates with reduced gait speed, with reduced step length bilaterally, and reduced hip extension bilaterally. Patient is    Body Structures Involved:  1. Nerves  2. Bones  3. Joints  4. Muscles Body Functions Affected:  1. Sensory/Pain  2. Neuromusculoskeletal  3. Movement Related Activities and Participation Affected:  1. Mobility  2. Self Care  3. Community, Social and [place]   Number of elements (examined above) that affect the Plan of Care: 4+: HIGH COMPLEXITY   CLINICAL PRESENTATION:   Presentation: Stable and uncomplicated: LOW COMPLEXITY   CLINICAL DECISION MAKING:   Outcome Measure: Tool Used: Modified Oswestry Low Back Pain Questionnaire  Score:  Initial: 12 (only completed front page)/50  Most Recent: X/50 (Date: -- )   Interpretation of Score: Each section is scored on a 0-5 scale, 5 representing the greatest disability. The scores of each section are added together for a total score of 50. Score 0 1-10 11-20 21-30 31-40 41-49 50   Modifier CH CI CJ CK CL CM CN     ? Mobility - Walking and Moving Around:     - CURRENT STATUS: CK - 40%-59% impaired, limited or restricted    - GOAL STATUS: CJ - 20%-39% impaired, limited or restricted    - D/C STATUS:  ---------------To be determined---------------    Medical Necessity:   · Skilled intervention continues to be required due to low back pain and difficulty with walking for prolonged distances.   Reason for Services/Other Comments:  · Patient continues to require skilled intervention due to low back pain. Use of outcome tool(s) and clinical judgement create a POC that gives a: Clear prediction of patient's progress: LOW COMPLEXITY            TREATMENT:   (In addition to Assessment/Re-Assessment sessions the following treatments were rendered)  Pre-treatment Symptoms/Complaints:  Pt states she was not sore from last PT session, only a little tired. Pain: Initial:     not rated Post Session:  Not rated     THERAPEUTIC EXERCISE ( minutes) for reduced low back pain. Performed 2 x 10 of posterior pelvic tilts, single knee-to-chest stretch, with instructions to perform very cautiously on R LE due to presence of R total hip, and posterior pelvic tilt with small bridge in order to decrease lower back pain, strengthen patient's hips and facilitate lumbar flexion to reduce discomfort. HEP:  Patient provided with handout with written instructions and pictures on performance of the exercises above, with instructions not to bring R hip beyond 90 degrees due to R MANJIT. Patient verbalized understanding. Aquatic Therapy (45 minutes): Aquatic treatment performed per flow grid for Decreased muscle strength, Decreased endurance, Decreased static/dynamic balance and reactive control, Decreased range of motion, Decreased activity endurance, Ease of movement and Low impact and reduced weight bearing activity. Cues provided for posture and exercises.        Aquatic Exercise Log       Date  10/16/18 Date  10/18/18 Date   Date   Date     Activity/ Exercise Parameters Parameters Parameters Parameters Parameters   Walking forward 6 6      Walking backward 6 6      Walking sideways 6 6        Marching 6 6        Goose Step 6 6        Tip toes 3 3        Heels 3 3        Lunges Long step 6 Long step 6      Side step squats        LE Exercises 4.5'  4.5'        Hip Flex/Ext 10 15        Hip Abd/Add 10 15        Hip IR/ER          Calf raises 10 15        Knee Flex 10 15        Squats          Leg Circles 10/10 15/15        Step Ups        UE Exercises          Squeeze In          Push Down          Pull Down          Bicep/Tricep        Rows/Press outs         Chi Positions          Trunk Rotation        Deep H2O/ Noodles  7' with blue rings        Stabilization          Arms only          Legs only  Bicycle 1 min      Cross   Country  1 min        Scissors  1 min        Crab walk        Lower abdominal   work   DKTC 1 min        Cardio          Jogging        Lap   Swimming          Stretches          Hamstrings          Heelcords          Piriformis          Neck          Treatment/Session Assessment:    · Response to Treatment:  Pt doing well. Plan to add weight next visit. · Compliance with Program/Exercises: Will assess as treatment progresses  · Recommendations/Intent for next treatment session: \"Next visit will focus on aquatic physical therapy.   Total Treatment Duration: 45 minutes  PT Patient Time In/Time Out  Time In: 1045  Time Out: [address], PTA

## 2018-10-23 NOTE — PROGRESS NOTES
Lisa Nunez  :   [place]  [facility], [address], [address] [facility]  [phone]   [phone]          OUTPATIENT PHYSICAL THERAPY:Cancellation Note 10/23/2018   ICD-10: Treatment Diagnosis: Low back pain (M54.5)  Precautions/Allergies:   Latex; Lactose; Adhesive; Atorvastatin; Augmentin [amoxicillin-pot clavulanate]; Codeine; Ezetimibe; Fluoxetine; Percocet [oxycodone-acetaminophen]; Potassium; Pravastatin; Prednisone; Simvastatin; and Wellbutrin [bupropion]   Fall Risk Score: 1 (? 5 = High Risk)  MD Orders: Evaluate and treat, pool/water aquatics for lower back pain MEDICAL/REFERRING DIAGNOSIS:  back   DATE OF ONSET: Chronic condition  REFERRING PHYSICIAN: Lucas Gabriel., *  RETURN PHYSICIAN APPOINTMENT: TBD     Pt called to cancel appointment today due to not feeling well.     Jenni Hodgson, PTA

## 2018-10-25 NOTE — PROGRESS NOTES
Lisa Nunez  :   [place]  [facility]: [facility], [address], [facility]  [phone]   [phone]          OUTPATIENT PHYSICAL THERAPY:Cancellation Note 10/25/2018   ICD-10: Treatment Diagnosis: Low back pain (M54.5)  Precautions/Allergies:   Latex; Lactose; Adhesive; Atorvastatin; Augmentin [amoxicillin-pot clavulanate]; Codeine; Ezetimibe; Fluoxetine; Percocet [oxycodone-acetaminophen]; Potassium; Pravastatin; Prednisone; Simvastatin; and Wellbutrin [bupropion]   Fall Risk Score: 1 (? 5 = High Risk)  MD Orders: Evaluate and treat, pool/water aquatics for lower back pain MEDICAL/REFERRING DIAGNOSIS:  back   DATE OF ONSET: Chronic condition  REFERRING PHYSICIAN: Beth Mcbride., *  RETURN PHYSICIAN APPOINTMENT: TBD     Pt called to cancel appointment today due to being sickl.     Devin Vanegas, PTA

## 2018-10-29 NOTE — PROGRESS NOTES
Lisa Nunez  :   [place]  Secondary: Jb Germain at [address], [address], [place] [address].  [phone]   [phone]          OUTPATIENT PHYSICAL [address] Note and Aquatic 10/29/2018   ICD-10: Treatment Diagnosis: Low back pain (M54.5)  Precautions/Allergies:   Latex; Lactose; Adhesive; Atorvastatin; Augmentin [amoxicillin-pot clavulanate]; Codeine; Ezetimibe; Fluoxetine; Percocet [oxycodone-acetaminophen]; Potassium; Pravastatin; Prednisone; Simvastatin; and Wellbutrin [bupropion]   Fall Risk Score: 1 (? 5 = High Risk)  MD Orders: Evaluate and treat, pool/water aquatics for lower back pain MEDICAL/REFERRING DIAGNOSIS:  back   DATE OF ONSET: Chronic condition  REFERRING PHYSICIAN: Alysha Arriaga., *  RETURN PHYSICIAN APPOINTMENT: TBD     INITIAL ASSESSMENT:  Ms. Christina Leon presents with pain in many body regions, with referral to aquatic therapy due to recalcitrant low back pain. Patient demonstrates limited lumbar range of motion, impaired posture, decreased strength, altered gait mechanics and a generalized reduction in physical functioning. Patient is likely to benefit from continued PT to reduce low back pain and improve her overall physical functioning. PROBLEM LIST (Impacting functional limitations):  1. Decreased Strength  2. Decreased Ambulation Ability/Technique  3. Increased Pain  4. Decreased Activity Tolerance  5. Decreased Flexibility/Joint Mobility  6. Decreased [place] with Home Exercise Program INTERVENTIONS PLANNED:  1. Home Exercise Program (HEP)  2. Manual Therapy  3. Neuromuscular Re-education/Strengthening  4. Range of Motion (ROM)  5. Therapeutic Activites  6. Therapeutic Exercise/Strengthening  7. Aquatic therapy   TREATMENT PLAN:  Effective Dates: 10/10/2018 TO 2019.   Frequency/Duration: 2 visits per week for 90 Days  GOALS: (Goals have been discussed and agreed upon with patient.)  Short-Term Functional Goals: Time Frame: 45 days  1. Patient will report 0-1/10 low back with normalized activities and improve score on Oswestry to <35% disability  2. Patient will be able to tolerate 45 minutes of aquatic physical therapy  3. Patient will be independent with HEP  Discharge Goals: Time Frame: 90 days  1. Patient will be able to ambulate >20 minutes without having to stop due to low back pain  2. Patient will have 5/5 hip abduction strength  3. Patient will report a subjective 50% improvement in overall function    Rehabilitation Potential For Stated Goals: FAIR                The information in this section was collected on 10-10-18 (except where otherwise noted). HISTORY:   History of Present Injury/Illness (Reason for Referral):  Patient reports a long history of lower back pain, and underwent surgery for a \"clean up\" 4 or 5 years ago. She reports that this helped initially, but has since not controlled the pain, and she has been receiving steroid injections, which have not been working. She has also received chiropractic treatment and sees a massage therapist to address her pain. She had a R total hip replacement 3-4 years ago, and underwent a R knee arthroscopy in 2008. Had an ICD implanted in 2017. Past Medical History/Comorbidities:   Ms. Alexandr Russell  has a past medical history of Anemia, Anxiety, Arthritis, Bundle branch block, left, CAD (coronary artery disease), Chicken pox, Chronic systolic CHF (congestive heart failure) ([facility] [address] Claustrophobia, Depression, Diverticulitis, Elevated cholesterol, H/O: whooping cough, Hiatal hernia, Hypertension, Measles, Mumps, NSAID sensitivity, Post-menopausal, PUD (peptic ulcer disease), and Pyloric ulcer associated with Helicobacter pylori.   Ms. Alexandr Russell  has a past surgical history that includes hx cataract removal (Bilateral, 2011); hx knee arthroscopy (Right, 2007); hx hip replacement (Right); hx hernia repair (2011); pr neurological procedure unlisted; hx breast biopsy (Left); pr left heart cath,percutaneous (2010/2004); hx heart catheterization (2016); hx other surgical (03/2017); hx pacemaker; hx gyn (Right); LAPAROSCOPIC EXAM/BIOPSY RIGHT OVARY (N/A, 2/20/2018); BIV ICD (N/A, 3/17/2017); RIGHT L4-5 ARABELLA LAMINECTOMY (Right, 1/6/2016); HIP ARTHROPLASTY TOTAL (Right, 0/0/7093); NISSEN FUNDOPLICATION LAPAROSCOPIC (N/A, 9/16/2011); HERNIA HIATAL REPAIR LAPAROSCOPIC (N/A, 9/16/2011); and GASTROSTOMY TUBE INSERTION LAPAROSCOPIC (N/A, 9/16/2011). Social History/Living Environment:    Patient lives alone in [address] [place] [address] in a [address] [facility] home. She has one small step to enter house, which is one level. Prior Level of Function/Work/Activity:  Patient is retired. Does use exercise equipment at [facility] on occasion. States that she typically uses a cane when ambulating at home. Current Medications:  Per attached handout at initial evaluation on 10-10-18     Current Outpatient Prescriptions:      Amiodarone  mg (1/2 tablet in morning)  - Amlodipine Besylate 5 MG tablet in morning  - Duloxetine HCL 60 MG cap (1 morning capsule)  - Hydrochlorothiazide 12.5 MG tablet (1 in the morning)  - Losartan postassium 25 mg tablet (2 in AM)  -  Olopatadine HCL 0.2% eye drop (1 in AM)  - Restasis multidose 0.05% eye   - Rexulti 1 MG tablet (1/2 in PM)  - Duloxetine HCL 30 MG cap (1 per day)  - Zolpidem tartrate 5 MG tablet (1 per day)   Date Last Reviewed:  10-29-18   Number of Personal Factors/Comorbidities that affect the Plan of Care: 1-2: MODERATE COMPLEXITY   EXAMINATION:   10-10-18    Observation/Orthostatic Postural Assessment:          Significant forward head posture with kyphotic posture. Ambulates with forward trunk lean and reduced gait speed with small steps bilaterally.   Palpation:          Tenderness and tightness to palpation of R lumbar paraspinals  ROM:          Lumbar flexion reduced by 50% with forward bending, and back bending 75-80% reduced actively. Limited hip extension during gait bilaterally. Strength:          Hip flexion 5/5 B        Hip abduction 4/5 B        Knee extension 5/5 B        Knee flexion 5/5 B        Ankle DF 5/5 B  Special Tests:          None  Neurological Screen:       Sensation to light touch intact bilaterally along all B LE dermatomes  Functional Mobility:         Sit to stands: uses hands to push up from seated position        Gait: Patient ambulates with reduced gait speed, with reduced step length bilaterally, and reduced hip extension bilaterally. Patient is    Body Structures Involved:  1. Nerves  2. Bones  3. Joints  4. Muscles Body Functions Affected:  1. Sensory/Pain  2. Neuromusculoskeletal  3. Movement Related Activities and Participation Affected:  1. Mobility  2. Self Care  3. Community, Social and [place]   Number of elements (examined above) that affect the Plan of Care: 4+: HIGH COMPLEXITY   CLINICAL PRESENTATION:   Presentation: Stable and uncomplicated: LOW COMPLEXITY   CLINICAL DECISION MAKING:   Outcome Measure: Tool Used: Modified Oswestry Low Back Pain Questionnaire  Score:  Initial: 12 (only completed front page)/50  Most Recent: X/50 (Date: -- )   Interpretation of Score: Each section is scored on a 0-5 scale, 5 representing the greatest disability. The scores of each section are added together for a total score of 50. Score 0 1-10 11-20 21-30 31-40 41-49 50   Modifier CH CI CJ CK CL CM CN     ? Mobility - Walking and Moving Around:     - CURRENT STATUS: CK - 40%-59% impaired, limited or restricted    - GOAL STATUS: CJ - 20%-39% impaired, limited or restricted    - D/C STATUS:  ---------------To be determined---------------    Medical Necessity:   · Skilled intervention continues to be required due to low back pain and difficulty with walking for prolonged distances.   Reason for Services/Other Comments:  · Patient continues to require skilled intervention due to low back pain. Use of outcome tool(s) and clinical judgement create a POC that gives a: Clear prediction of patient's progress: LOW COMPLEXITY            TREATMENT:   (In addition to Assessment/Re-Assessment sessions the following treatments were rendered)  Pre-treatment Symptoms/Complaints:  Pt was sick last week and says she felt weak. Pain: Initial:     not rated Post Session:  Not rated     THERAPEUTIC EXERCISE ( minutes) for reduced low back pain. Performed 2 x 10 of posterior pelvic tilts, single knee-to-chest stretch, with instructions to perform very cautiously on R LE due to presence of R total hip, and posterior pelvic tilt with small bridge in order to decrease lower back pain, strengthen patient's hips and facilitate lumbar flexion to reduce discomfort. HEP:  Patient provided with handout with written instructions and pictures on performance of the exercises above, with instructions not to bring R hip beyond 90 degrees due to R MANJIT. Patient verbalized understanding. Aquatic Therapy (45 minutes): Aquatic treatment performed per flow grid for Decreased muscle strength, Decreased endurance, Decreased static/dynamic balance and reactive control, Decreased range of motion, Decreased activity endurance, Ease of movement and Low impact and reduced weight bearing activity. Cues provided for posture and exercises.        Aquatic Exercise Log       Date  10/16/18 Date  10/18/18 Date  10/29 Date   Date     Activity/ Exercise Parameters Parameters Parameters Parameters Parameters   Walking forward 6 6 6     Walking backward 6 6 6     Walking sideways 6 6 6       Marching 6 6 6       Goose Step 6 6 6       Tip toes 3 3 3       Heels 3 3 3       Lunges Long step 6 Long step 6 Long step 6     Side step squats        LE Exercises 4.5'  4.5' 2#       Hip Flex/Ext 10 15 15       Hip Abd/Add 10 15 15       Hip IR/ER          Calf raises 10 15 15       Knee Flex 10 15 15       Squats          Leg Circles 10/10 15/15 15/15       Step Ups        UE Exercises          Squeeze In          Push Down          Pull Down          Bicep/Tricep        Rows/Press outs         Chi Positions          Trunk Rotation        Deep H2O/ Noodles  7' with blue rings 7' with rings and 2#       Stabilization          Arms only          Legs only  Bicycle 1 min 1 min     [place]  1 min 1 min       Scissors  1 min 1 min       Crab walk        Lower abdominal   work   DKTC 1 min        Cardio          Jogging        Lap   Swimming          Stretches          Hamstrings          Heelcords          Piriformis          Neck          Treatment/Session Assessment:    · Response to Treatment:  Pt doing well. Did well with weight. · Compliance with Program/Exercises: Will assess as treatment progresses  · Recommendations/Intent for next treatment session: \"Next visit will focus on aquatic physical therapy.   Total Treatment Duration: 45 minutes  PT Patient Time In/Time Out  Time In: 1100  Time Out: [address] Leonardo Orozco, PT

## 2018-11-06 NOTE — PROGRESS NOTES
Lisa Nunez  : [birth year]  [place]  Secondary: Jb Germain at [address], [address], [place] [address].  [phone]   [phone]          OUTPATIENT PHYSICAL [address] Note and Aquatic 2018   ICD-10: Treatment Diagnosis: Low back pain (M54.5)  Precautions/Allergies:   Latex; Lactose; Adhesive; Atorvastatin; Augmentin [amoxicillin-pot clavulanate]; Codeine; Ezetimibe; Fluoxetine; Percocet [oxycodone-acetaminophen]; Potassium; Pravastatin; Prednisone; Simvastatin; and Wellbutrin [bupropion]   Fall Risk Score: 1 (? 5 = High Risk)  MD Orders: Evaluate and treat, pool/water aquatics for lower back pain MEDICAL/REFERRING DIAGNOSIS:  back   DATE OF ONSET: Chronic condition  REFERRING PHYSICIAN: Anjana Moran., *  RETURN PHYSICIAN APPOINTMENT: TBD     INITIAL ASSESSMENT:  Ms. Kristyn Pack presents with pain in many body regions, with referral to aquatic therapy due to recalcitrant low back pain. Patient demonstrates limited lumbar range of motion, impaired posture, decreased strength, altered gait mechanics and a generalized reduction in physical functioning. Patient is likely to benefit from continued PT to reduce low back pain and improve her overall physical functioning. PROBLEM LIST (Impacting functional limitations):  1. Decreased Strength  2. Decreased Ambulation Ability/Technique  3. Increased Pain  4. Decreased Activity Tolerance  5. Decreased Flexibility/Joint Mobility  6. Decreased [place] with Home Exercise Program INTERVENTIONS PLANNED:  1. Home Exercise Program (HEP)  2. Manual Therapy  3. Neuromuscular Re-education/Strengthening  4. Range of Motion (ROM)  5. Therapeutic Activites  6. Therapeutic Exercise/Strengthening  7. Aquatic therapy   TREATMENT PLAN:  Effective Dates: 10/10/2018 TO 2019.   Frequency/Duration: 2 visits per week for 90 Days  GOALS: (Goals have been discussed and agreed upon with patient.)  Short-Term Functional Goals: Time Frame: 45 days  1. Patient will report 0-1/10 low back with normalized activities and improve score on Oswestry to <35% disability  2. Patient will be able to tolerate 45 minutes of aquatic physical therapy  3. Patient will be independent with HEP  Discharge Goals: Time Frame: 90 days  1. Patient will be able to ambulate >20 minutes without having to stop due to low back pain  2. Patient will have 5/5 hip abduction strength  3. Patient will report a subjective 50% improvement in overall function    Rehabilitation Potential For Stated Goals: FAIR                The information in this section was collected on 10-10-18 (except where otherwise noted). HISTORY:   History of Present Injury/Illness (Reason for Referral):  Patient reports a long history of lower back pain, and underwent surgery for a \"clean up\" 4 or 5 years ago. She reports that this helped initially, but has since not controlled the pain, and she has been receiving steroid injections, which have not been working. She has also received chiropractic treatment and sees a massage therapist to address her pain. She had a R total hip replacement 3-4 years ago, and underwent a R knee arthroscopy in 2008. Had an ICD implanted in 2017. Past Medical History/Comorbidities:   Ms. Sun Crow  has a past medical history of Anemia, Anxiety, Arthritis, Bundle branch block, left, CAD (coronary artery disease), Chicken pox, Chronic systolic CHF (congestive heart failure) ([facility] [address] Claustrophobia, Depression, Diverticulitis, Elevated cholesterol, H/O: whooping cough, Hiatal hernia, Hypertension, Measles, Mumps, NSAID sensitivity, Post-menopausal, PUD (peptic ulcer disease), and Pyloric ulcer associated with Helicobacter pylori.   Ms. Sun Crow  has a past surgical history that includes hx cataract removal (Bilateral, 2011); hx knee arthroscopy (Right, 2007); hx hip replacement (Right); hx hernia repair (2011); pr neurological procedure unlisted; hx breast biopsy (Left); pr left heart cath,percutaneous (2010/2004); hx heart catheterization (2016); hx other surgical (03/2017); hx pacemaker; hx gyn (Right); LAPAROSCOPIC EXAM/BIOPSY RIGHT OVARY (N/A, 2/20/2018); BIV ICD (N/A, 3/17/2017); RIGHT L4-5 ARABELLA LAMINECTOMY (Right, 1/6/2016); HIP ARTHROPLASTY TOTAL (Right, 3/5/5358); NISSEN FUNDOPLICATION LAPAROSCOPIC (N/A, 9/16/2011); HERNIA HIATAL REPAIR LAPAROSCOPIC (N/A, 9/16/2011); and GASTROSTOMY TUBE INSERTION LAPAROSCOPIC (N/A, 9/16/2011). Social History/Living Environment:    Patient lives alone in [address] [place] [address] in a [address] [facility] home. She has one small step to enter house, which is one level. Prior Level of Function/Work/Activity:  Patient is retired. Does use exercise equipment at [facility] on occasion. States that she typically uses a cane when ambulating at home. Current Medications:  Per attached handout at initial evaluation on 10-10-18     Current Outpatient Prescriptions:      Amiodarone  mg (1/2 tablet in morning)  - Amlodipine Besylate 5 MG tablet in morning  - Duloxetine HCL 60 MG cap (1 morning capsule)  - Hydrochlorothiazide 12.5 MG tablet (1 in the morning)  - Losartan postassium 25 mg tablet (2 in AM)  -  Olopatadine HCL 0.2% eye drop (1 in AM)  - Restasis multidose 0.05% eye   - Rexulti 1 MG tablet (1/2 in PM)  - Duloxetine HCL 30 MG cap (1 per day)  - Zolpidem tartrate 5 MG tablet (1 per day)   Date Last Reviewed:  10-29-18   Number of Personal Factors/Comorbidities that affect the Plan of Care: 1-2: MODERATE COMPLEXITY   EXAMINATION:   10-10-18    Observation/Orthostatic Postural Assessment:          Significant forward head posture with kyphotic posture. Ambulates with forward trunk lean and reduced gait speed with small steps bilaterally.   Palpation:          Tenderness and tightness to palpation of R lumbar paraspinals  ROM:          Lumbar flexion reduced by 50% with forward bending, and back bending 75-80% reduced actively. Limited hip extension during gait bilaterally. Strength:          Hip flexion 5/5 B        Hip abduction 4/5 B        Knee extension 5/5 B        Knee flexion 5/5 B        Ankle DF 5/5 B  Special Tests:          None  Neurological Screen:       Sensation to light touch intact bilaterally along all B LE dermatomes  Functional Mobility:         Sit to stands: uses hands to push up from seated position        Gait: Patient ambulates with reduced gait speed, with reduced step length bilaterally, and reduced hip extension bilaterally. Patient is    Body Structures Involved:  1. Nerves  2. Bones  3. Joints  4. Muscles Body Functions Affected:  1. Sensory/Pain  2. Neuromusculoskeletal  3. Movement Related Activities and Participation Affected:  1. Mobility  2. Self Care  3. Community, Social and [place]   Number of elements (examined above) that affect the Plan of Care: 4+: HIGH COMPLEXITY   CLINICAL PRESENTATION:   Presentation: Stable and uncomplicated: LOW COMPLEXITY   CLINICAL DECISION MAKING:   Outcome Measure: Tool Used: Modified Oswestry Low Back Pain Questionnaire  Score:  Initial: 12 (only completed front page)/50  Most Recent: X/50 (Date: -- )   Interpretation of Score: Each section is scored on a 0-5 scale, 5 representing the greatest disability. The scores of each section are added together for a total score of 50. Score 0 1-10 11-20 21-30 31-40 41-49 50   Modifier CH CI CJ CK CL CM CN     ? Mobility - Walking and Moving Around:     - CURRENT STATUS: CK - 40%-59% impaired, limited or restricted    - GOAL STATUS: CJ - 20%-39% impaired, limited or restricted    - D/C STATUS:  ---------------To be determined---------------    Medical Necessity:   · Skilled intervention continues to be required due to low back pain and difficulty with walking for prolonged distances.   Reason for Services/Other Comments:  · Patient continues to require skilled intervention due to low back pain. Use of outcome tool(s) and clinical judgement create a POC that gives a: Clear prediction of patient's progress: LOW COMPLEXITY            TREATMENT:   (In addition to Assessment/Re-Assessment sessions the following treatments were rendered)  Pre-treatment Symptoms/Complaints:  Pt states she does not feel well today. Complaints of sore throat and headache. Pain: Initial:     not rated Post Session:  Not rated     THERAPEUTIC EXERCISE ( minutes) for reduced low back pain. Performed 2 x 10 of posterior pelvic tilts, single knee-to-chest stretch, with instructions to perform very cautiously on R LE due to presence of R total hip, and posterior pelvic tilt with small bridge in order to decrease lower back pain, strengthen patient's hips and facilitate lumbar flexion to reduce discomfort. HEP:  Patient provided with handout with written instructions and pictures on performance of the exercises above, with instructions not to bring R hip beyond 90 degrees due to R MANJIT. Patient verbalized understanding. Aquatic Therapy (45 minutes): Aquatic treatment performed per flow grid for Decreased muscle strength, Decreased endurance, Decreased static/dynamic balance and reactive control, Decreased range of motion, Decreased activity endurance, Ease of movement and Low impact and reduced weight bearing activity. Cues provided for posture and exercises.        Aquatic Exercise Log       Date  10/16/18 Date  10/18/18 Date  10/29 Date  11/1/18 Date  11/6/18   Activity/ Exercise Parameters Parameters Parameters Parameters Parameters   Walking forward 6 6 6 6 6   Walking backward 6 6 6 6 6   Walking sideways 6 6 6 6 6     Marching 6 6 6 6 6     Goose Step 6 6 6 6 6     Tip toes 3 3 3 3 3     Heels 3 3 3 3 3     Lunges Long step 6 Long step 6 Long step 6 Long step 6 Long step 6   Side step squats        LE Exercises 4.5'  4.5' 2# 2# wts 2# wts     Hip Flex/Ext 10 15 15 15 15     Hip Abd/Add 10 15 15 15 15     Hip IR/ER          Calf raises 10 15 15 15 15     Knee Flex 10 15 15 15 15     Squats          Leg Circles 10/10 15/15 15/15 15/15 15/15     Step Ups     15   UE Exercises          Squeeze In          Push Down          Pull Down          Bicep/Tricep        Rows/Press outs         Chi Positions          Trunk Rotation        Deep H2O/ Noodles  7' with blue rings 7' with rings and 2# 7' with blue rings and 2# wts      Stabilization    Traction x4 min hanging in 7' with 2# wts      Arms only          Legs only  Bicycle 1 min 1 min 2 min    [address]  1 min 1 min 2 min      Scissors  1 min 1 min 2 min      Crab walk        Lower abdominal   work   DKTC 1 min  DKTC 2 min       Cardio          Jogging     4 min in 4. 5' with 2# wts   Lap   Swimming          Stretches          Hamstrings          Heelcords          Piriformis          Neck          Treatment/Session Assessment:    · Response to Treatment:   Pt not feeling well this morning. Appointment with her cardiologist tomorrow. She declined deep water exercises today. · Compliance with Program/Exercises: Will assess as treatment progresses  · Recommendations/Intent for next treatment session: \"Next visit will focus on aquatic physical therapy.   Total Treatment Duration: 45 minutes  PT Patient Time In/Time Out  Time In: 1045  Time Out: [address], PTA

## 2018-11-08 NOTE — PROGRESS NOTES
Lisa Nunez  :   [place]  Secondary: Jb Germain at [address], [address], [place] [address].  [phone]   [phone]          OUTPATIENT PHYSICAL THERAPY:Cancellation Note 2018   ICD-10: Treatment Diagnosis: Low back pain (M54.5)  Precautions/Allergies:   Latex; Lactose; Adhesive; Atorvastatin; Augmentin [amoxicillin-pot clavulanate]; Codeine; Ezetimibe; Fluoxetine; Percocet [oxycodone-acetaminophen]; Potassium; Pravastatin; Prednisone; Simvastatin; and Wellbutrin [bupropion]   Fall Risk Score: 1 (? 5 = High Risk)  MD Orders: Evaluate and treat, pool/water aquatics for lower back pain MEDICAL/REFERRING DIAGNOSIS:  back   DATE OF ONSET: Chronic condition  REFERRING PHYSICIAN: Oly Ramon., *  RETURN PHYSICIAN APPOINTMENT: TBD     Pt called to cancel appointment today due to not feeling well.     Delaney Fontana, PTA

## 2018-11-15 NOTE — PROGRESS NOTES
Lisa Nunez  :   [place]  Secondary: Jb Geramin at [address], [address], [place] [address].  [phone]   [phone]          OUTPATIENT PHYSICAL [address] and Aquatic 11/15/2018   ICD-10: Treatment Diagnosis: Low back pain (M54.5)  Precautions/Allergies:   Latex; Lactose; Adhesive; Atorvastatin; Augmentin [amoxicillin-pot clavulanate]; Codeine; Ezetimibe; Fluoxetine; Percocet [oxycodone-acetaminophen]; Potassium; Pravastatin; Prednisone; Simvastatin; and Wellbutrin [bupropion]   Fall Risk Score: 1 (? 5 = High Risk)  MD Orders: Evaluate and treat, pool/water aquatics for lower back pain MEDICAL/REFERRING DIAGNOSIS:  back   DATE OF ONSET: Chronic condition  REFERRING PHYSICIAN: Alysha Arriaga., *  RETURN PHYSICIAN APPOINTMENT: TBD     INITIAL ASSESSMENT:  Ms. Christina Leon presents with pain in many body regions, with referral to aquatic therapy due to recalcitrant low back pain. Patient demonstrates limited lumbar range of motion, impaired posture, decreased strength, altered gait mechanics and a generalized reduction in physical functioning. Patient is likely to benefit from continued PT to reduce low back pain and improve her overall physical functioning. PROBLEM LIST (Impacting functional limitations):  1. Decreased Strength  2. Decreased Ambulation Ability/Technique  3. Increased Pain  4. Decreased Activity Tolerance  5. Decreased Flexibility/Joint Mobility  6. Decreased [place] with Home Exercise Program INTERVENTIONS PLANNED:  1. Home Exercise Program (HEP)  2. Manual Therapy  3. Neuromuscular Re-education/Strengthening  4. Range of Motion (ROM)  5. Therapeutic Activites  6. Therapeutic Exercise/Strengthening  7. Aquatic therapy   TREATMENT PLAN:  Effective Dates: 10/10/2018 TO 2019.   Frequency/Duration: 2 visits per week for 90 Days  GOALS: (Goals have been discussed and agreed upon with patient.)  Short-Term Functional Goals: Time Frame: 45 days  1. Patient will report 0-1/10 low back with normalized activities and improve score on Oswestry to <35% disability  2. Patient will be able to tolerate 45 minutes of aquatic physical therapy  3. Patient will be independent with HEP  Discharge Goals: Time Frame: 90 days  1. Patient will be able to ambulate >20 minutes without having to stop due to low back pain  2. Patient will have 5/5 hip abduction strength  3. Patient will report a subjective 50% improvement in overall function    Rehabilitation Potential For Stated Goals: FAIR                The information in this section was collected on 10-10-18 (except where otherwise noted). HISTORY:   History of Present Injury/Illness (Reason for Referral):  Patient reports a long history of lower back pain, and underwent surgery for a \"clean up\" 4 or 5 years ago. She reports that this helped initially, but has since not controlled the pain, and she has been receiving steroid injections, which have not been working. She has also received chiropractic treatment and sees a massage therapist to address her pain. She had a R total hip replacement 3-4 years ago, and underwent a R knee arthroscopy in 2008. Had an ICD implanted in 2017. Past Medical History/Comorbidities:   Ms. Radha Hess  has a past medical history of Anemia, Anxiety, Arthritis, Bundle branch block, left, CAD (coronary artery disease), Chicken pox, Chronic systolic CHF (congestive heart failure) ([facility] [address] Claustrophobia, Depression, Diverticulitis, Elevated cholesterol, H/O: whooping cough, Hiatal hernia, Hypertension, Measles, Mumps, NSAID sensitivity, Post-menopausal, PUD (peptic ulcer disease), and Pyloric ulcer associated with Helicobacter pylori.   Ms. Radha Hess  has a past surgical history that includes hx cataract removal (Bilateral, 2011); hx knee arthroscopy (Right, 2007); hx hip replacement (Right); hx hernia repair (2011); pr neurological procedure unlisted; hx breast biopsy (Left); pr left heart cath,percutaneous (2010/2004); hx heart catheterization (2016); hx other surgical (03/2017); hx pacemaker; hx gyn (Right); LAPAROSCOPIC EXAM/BIOPSY RIGHT OVARY (N/A, 2/20/2018); BIV ICD (N/A, 3/17/2017); RIGHT L4-5 ARABELLA LAMINECTOMY (Right, 1/6/2016); HIP ARTHROPLASTY TOTAL (Right, 0/9/1729); NISSEN FUNDOPLICATION LAPAROSCOPIC (N/A, 9/16/2011); HERNIA HIATAL REPAIR LAPAROSCOPIC (N/A, 9/16/2011); and GASTROSTOMY TUBE INSERTION LAPAROSCOPIC (N/A, 9/16/2011). Social History/Living Environment:    Patient lives alone in [address] [place] [address] in a [address] [facility] home. She has one small step to enter house, which is one level. Prior Level of Function/Work/Activity:  Patient is retired. Does use exercise equipment at [employer] on occasion. States that she typically uses a cane when ambulating at home. Current Medications:  Per attached handout at initial evaluation on 10-10-18     Current Outpatient Prescriptions:      Amiodarone  mg (1/2 tablet in morning)  - Amlodipine Besylate 5 MG tablet in morning  - Duloxetine HCL 60 MG cap (1 morning capsule)  - Hydrochlorothiazide 12.5 MG tablet (1 in the morning)  - Losartan postassium 25 mg tablet (2 in AM)  -  Olopatadine HCL 0.2% eye drop (1 in AM)  - Restasis multidose 0.05% eye   - Rexulti 1 MG tablet (1/2 in PM)  - Duloxetine HCL 30 MG cap (1 per day)  - Zolpidem tartrate 5 MG tablet (1 per day)   Date Last Reviewed:  10-29-18   Number of Personal Factors/Comorbidities that affect the Plan of Care: 1-2: MODERATE COMPLEXITY   EXAMINATION:   10-10-18    Observation/Orthostatic Postural Assessment:          Significant forward head posture with kyphotic posture. Ambulates with forward trunk lean and reduced gait speed with small steps bilaterally.   Palpation:          Tenderness and tightness to palpation of R lumbar paraspinals  ROM:          Lumbar flexion reduced by 50% with forward bending, and back bending 75-80% reduced actively. Limited hip extension during gait bilaterally. Strength:          Hip flexion 5/5 B        Hip abduction 4/5 B        Knee extension 5/5 B        Knee flexion 5/5 B        Ankle DF 5/5 B  Special Tests:          None  Neurological Screen:       Sensation to light touch intact bilaterally along all B LE dermatomes  Functional Mobility:         Sit to stands: uses hands to push up from seated position        Gait: Patient ambulates with reduced gait speed, with reduced step length bilaterally, and reduced hip extension bilaterally. Patient is    Body Structures Involved:  1. Nerves  2. Bones  3. Joints  4. Muscles Body Functions Affected:  1. Sensory/Pain  2. Neuromusculoskeletal  3. Movement Related Activities and Participation Affected:  1. Mobility  2. Self Care  3. Community, Social and [place]   Number of elements (examined above) that affect the Plan of Care: 4+: HIGH COMPLEXITY   CLINICAL PRESENTATION:   Presentation: Stable and uncomplicated: LOW COMPLEXITY   CLINICAL DECISION MAKING:   Outcome Measure: Tool Used: Modified Oswestry Low Back Pain Questionnaire  Score:  Initial: 12 (only completed front page)/50  Most Recent: X/50 (Date: -- )   Interpretation of Score: Each section is scored on a 0-5 scale, 5 representing the greatest disability. The scores of each section are added together for a total score of 50. Score 0 1-10 11-20 21-30 31-40 41-49 50   Modifier CH CI CJ CK CL CM CN     ? Mobility - Walking and Moving Around:     - CURRENT STATUS: CK - 40%-59% impaired, limited or restricted    - GOAL STATUS: CJ - 20%-39% impaired, limited or restricted    - D/C STATUS:  ---------------To be determined---------------    Medical Necessity:   · Skilled intervention continues to be required due to low back pain and difficulty with walking for prolonged distances.   Reason for Services/Other Comments:  · Patient continues to require skilled intervention due to low back pain. Use of outcome tool(s) and clinical judgement create a POC that gives a: Clear prediction of patient's progress: LOW COMPLEXITY            TREATMENT:   (In addition to Assessment/Re-Assessment sessions the following treatments were rendered)  Pre-treatment Symptoms/Complaints:  Pt states she still does not feel well. Complaints of her knee having increased pain. Pain: Initial:     not rated Post Session:  Not rated     THERAPEUTIC EXERCISE ( minutes) for reduced low back pain. Performed 2 x 10 of posterior pelvic tilts, single knee-to-chest stretch, with instructions to perform very cautiously on R LE due to presence of R total hip, and posterior pelvic tilt with small bridge in order to decrease lower back pain, strengthen patient's hips and facilitate lumbar flexion to reduce discomfort. HEP:  Patient provided with handout with written instructions and pictures on performance of the exercises above, with instructions not to bring R hip beyond 90 degrees due to R MANJIT. Patient verbalized understanding. Aquatic Therapy (45 minutes): Aquatic treatment performed per flow grid for Decreased muscle strength, Decreased endurance, Decreased static/dynamic balance and reactive control, Decreased range of motion, Decreased activity endurance, Ease of movement and Low impact and reduced weight bearing activity. Cues provided for posture and exercises.        Aquatic Exercise Log       Date  10/16/18 Date  10/18/18 Date  10/29 Date  11/1/18 Date  11/6/18 Date  11/15/18   Activity/ Exercise Parameters Parameters Parameters Parameters Parameters    Walking forward 6 6 6 6 6 6   Walking backward 6 6 6 6 6 6   Walking sideways 6 6 6 6 6 6     Marching 6 6 6 6 6 6     Goose Step 6 6 6 6 6 6     Tip toes 3 3 3 3 3 3     Heels 3 3 3 3 3 3     Lunges Long step 6 Long step 6 Long step 6 Long step 6 Long step 6 Long step 6   Side step squats         LE Exercises 4.5'  4.5' 2# 2# wts 2# wts Hip Flex/Ext 10 15 15 15 15 15     Hip Abd/Add 10 15 15 15 15 15     Hip IR/ER           Calf raises 10 15 15 15 15 15     Knee Flex 10 15 15 15 15 15     Squats           Leg Circles 10/10 15/15 15/15 15/15 15/15 15/15     Step Ups     15 15   UE Exercises           Squeeze In           Push Down           Pull Down           Bicep/Tricep         Rows/Press outs          Chi Positions           Trunk Rotation         Deep H2O/ Noodles  7' with blue rings 7' with rings and 2# 7' with blue rings and 2# wts  7' with blue rings      Stabilization    Traction x4 min hanging in 7' with 2# wts       Arms only           Legs only  Bicycle 1 min 1 min 2 min  2 min   [address]  1 min 1 min 2 min  2 min     Scissors  1 min 1 min 2 min  2 min     Crab walk         Lower abdominal   work   DKTC 1 min  DKTC 2 min   DKTC 2 min     Cardio           Jogging     4 min in 4. 5' with 2# wts 2 min in 4.5'    Lap   Swimming           Stretches           Hamstrings           Heelcords           Piriformis           Neck           Treatment/Session Assessment:    · Response to Treatment:   Pt is still waiting to her from lab results from cardiologist.  She has an appointment next week with an orthopedic surgeon for her knee. This is her last scheduled visit in the pool as she will see the PT on next visit. · Compliance with Program/Exercises: Will assess as treatment progresses  · Recommendations/Intent for next treatment session: \"Next visit will focus on aquatic physical therapy.   Total Treatment Duration: 45 minutes  PT Patient Time In/Time Out  Time In: 1045  Time Out: [address], PTA

## 2019-01-01 ENCOUNTER — APPOINTMENT (OUTPATIENT)
Dept: CT IMAGING | Age: 84
End: 2019-01-01
Attending: EMERGENCY MEDICINE
Payer: MEDICARE

## 2019-01-01 ENCOUNTER — HOSPITAL ENCOUNTER (OUTPATIENT)
Dept: PHYSICAL THERAPY | Age: 84
Discharge: HOME | End: 2019-02-04
Attending: ORTHOPAEDIC SURGERY
Payer: MEDICARE

## 2019-01-01 ENCOUNTER — HOSPITAL ENCOUNTER (OUTPATIENT)
Dept: SURGERY | Age: 84
Discharge: HOME | End: 2019-02-04
Attending: ORTHOPAEDIC SURGERY
Payer: MEDICARE

## 2019-01-01 ENCOUNTER — APPOINTMENT (OUTPATIENT)
Dept: GENERAL RADIOLOGY | Age: 84
End: 2019-01-01
Attending: EMERGENCY MEDICINE
Payer: MEDICARE

## 2019-01-01 ENCOUNTER — HOSPITAL ENCOUNTER (OUTPATIENT)
Dept: GENERAL RADIOLOGY | Age: 84
Discharge: HOME | End: 2019-05-24
Attending: NURSE PRACTITIONER

## 2019-01-01 ENCOUNTER — HOSPITAL ENCOUNTER (OUTPATIENT)
Dept: CT IMAGING | Age: 84
Discharge: HOME | End: 2019-03-06
Attending: INTERNAL MEDICINE
Payer: MEDICARE

## 2019-01-01 ENCOUNTER — HOSPITAL ENCOUNTER (EMERGENCY)
Age: 84
Discharge: HOME | End: 2019-04-23
Attending: EMERGENCY MEDICINE
Payer: MEDICARE

## 2019-01-01 ENCOUNTER — HOSPITAL ENCOUNTER (EMERGENCY)
Age: 84
Discharge: HOME | End: 2019-04-01
Attending: EMERGENCY MEDICINE
Payer: MEDICARE

## 2019-01-01 VITALS
SYSTOLIC BLOOD PRESSURE: 170 MMHG | HEIGHT: 67 IN | WEIGHT: 165 LBS | DIASTOLIC BLOOD PRESSURE: 77 MMHG | HEART RATE: 93 BPM | RESPIRATION RATE: 16 BRPM | BODY MASS INDEX: 25.9 KG/M2 | OXYGEN SATURATION: 95 % | TEMPERATURE: 97.4 F

## 2019-01-01 VITALS
TEMPERATURE: 98.1 F | BODY MASS INDEX: 25.11 KG/M2 | SYSTOLIC BLOOD PRESSURE: 178 MMHG | HEART RATE: 98 BPM | RESPIRATION RATE: 16 BRPM | WEIGHT: 160 LBS | DIASTOLIC BLOOD PRESSURE: 84 MMHG | HEIGHT: 67 IN | OXYGEN SATURATION: 98 %

## 2019-01-01 VITALS
HEART RATE: 68 BPM | OXYGEN SATURATION: 96 % | TEMPERATURE: 98.2 F | HEIGHT: 67 IN | SYSTOLIC BLOOD PRESSURE: 152 MMHG | DIASTOLIC BLOOD PRESSURE: 80 MMHG | RESPIRATION RATE: 16 BRPM | BODY MASS INDEX: 24.48 KG/M2 | WEIGHT: 156 LBS

## 2019-01-01 DIAGNOSIS — S16.1XXA STRAIN OF NECK MUSCLE, INITIAL ENCOUNTER: ICD-10-CM

## 2019-01-01 DIAGNOSIS — R63.0 ANOREXIA: ICD-10-CM
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
## 2019-01-01 DIAGNOSIS — R11.0 NAUSEA: ICD-10-CM

## 2019-01-01 DIAGNOSIS — S01.01XA LACERATION OF SCALP, INITIAL ENCOUNTER: Primary | ICD-10-CM

## 2019-01-01 DIAGNOSIS — R63.4 LOSS OF WEIGHT: ICD-10-CM

## 2019-01-01 DIAGNOSIS — R10.84 ABDOMINAL PAIN, GENERALIZED: ICD-10-CM

## 2019-01-01 LAB
ANION GAP SERPL CALC-SCNC: 10 MMOL/L
APPEARANCE UR: ABNORMAL
APTT PPP: 30 SEC (ref 24.7–39.8)
BACTERIA SPEC CULT: NORMAL
BACTERIA URNS QL MICRO: 0 /HPF
BASOPHILS # BLD: 0.1 K/UL (ref 0–0.2)
BASOPHILS NFR BLD: 1 % (ref 0–2)
BILIRUB UR QL: NEGATIVE
BUN SERPL-MCNC: 14 MG/DL (ref 8–23)
CALCIUM SERPL-MCNC: 9.6 MG/DL (ref 8.3–10.4)
CASTS URNS QL MICRO: ABNORMAL /LPF
CHLORIDE SERPL-SCNC: 101 MMOL/L (ref 98–107)
CO2 SERPL-SCNC: 28 MMOL/L (ref 21–32)
COLOR UR: YELLOW
CREAT BLD-MCNC: 1.1 MG/DL (ref 0.8–1.5)
CREAT SERPL-MCNC: 1.07 MG/DL (ref 0.6–1)
DIFFERENTIAL METHOD BLD: NORMAL
EOSINOPHIL # BLD: 0.3 K/UL (ref 0–0.8)
EOSINOPHIL NFR BLD: 3 % (ref 0.5–7.8)
EPI CELLS #/AREA URNS HPF: ABNORMAL /HPF
ERYTHROCYTE [DISTWIDTH] IN BLOOD BY AUTOMATED COUNT: 14 % (ref 11.9–14.6)
GLUCOSE SERPL-MCNC: 105 MG/DL (ref 65–100)
GLUCOSE UR STRIP.AUTO-MCNC: NEGATIVE MG/DL
HCT VFR BLD AUTO: 44.8 % (ref 35.8–46.3)
HGB BLD-MCNC: 14.5 G/DL (ref 11.7–15.4)
HGB UR QL STRIP: NEGATIVE
IMM GRANULOCYTES # BLD AUTO: 0.1 K/UL (ref 0–0.5)
IMM GRANULOCYTES NFR BLD AUTO: 1 % (ref 0–5)
INR PPP: 0.8
KETONES UR QL STRIP.AUTO: NEGATIVE MG/DL
LEUKOCYTE ESTERASE UR QL STRIP.AUTO: ABNORMAL
LYMPHOCYTES # BLD: 1.6 K/UL (ref 0.5–4.6)
LYMPHOCYTES NFR BLD: 18 % (ref 13–44)
MCH RBC QN AUTO: 29.2 PG (ref 26.1–32.9)
MCHC RBC AUTO-ENTMCNC: 32.4 G/DL (ref 31.4–35)
MCV RBC AUTO: 90.3 FL (ref 79.6–97.8)
MONOCYTES # BLD: 1 K/UL (ref 0.1–1.3)
MONOCYTES NFR BLD: 10 % (ref 4–12)
NEUTS SEG # BLD: 6.4 K/UL (ref 1.7–8.2)
NEUTS SEG NFR BLD: 68 % (ref 43–78)
NITRITE UR QL STRIP.AUTO: NEGATIVE
NRBC # BLD: 0 K/UL (ref 0–0.2)
PH UR STRIP: 6 [PH] (ref 5–9)
PLATELET # BLD AUTO: 252 K/UL (ref 150–450)
PMV BLD AUTO: 9.8 FL (ref 9.4–12.3)
POTASSIUM SERPL-SCNC: 3.3 MMOL/L (ref 3.5–5.1)
PROT UR STRIP-MCNC: ABNORMAL MG/DL
PROTHROMBIN TIME: 11.7 SEC (ref 11.7–14.5)
RBC # BLD AUTO: 4.96 M/UL (ref 4.05–5.2)
RBC #/AREA URNS HPF: ABNORMAL /HPF
SERVICE CMNT-IMP: NORMAL
SODIUM SERPL-SCNC: 139 MMOL/L (ref 136–145)
SP GR UR REFRACTOMETRY: 1.02 (ref 1–1.02)
UROBILINOGEN UR QL STRIP.AUTO: 0.2 EU/DL (ref 0.2–1)
WBC # BLD AUTO: 9.4 K/UL (ref 4.3–11.1)
WBC URNS QL MICRO: ABNORMAL /HPF

## 2019-01-01 PROCEDURE — 85025 COMPLETE CBC W/AUTO DIFF WBC: CPT

## 2019-01-01 PROCEDURE — 85610 PROTHROMBIN TIME: CPT

## 2019-01-01 PROCEDURE — 72050 X-RAY EXAM NECK SPINE 4/5VWS: CPT

## 2019-01-01 PROCEDURE — 75810000293 HC SIMP/SUPERF WND  RPR: Performed by: EMERGENCY MEDICINE

## 2019-01-01 PROCEDURE — 99283 EMERGENCY DEPT VISIT LOW MDM: CPT | Performed by: EMERGENCY MEDICINE

## 2019-01-01 PROCEDURE — 99284 EMERGENCY DEPT VISIT MOD MDM: CPT | Performed by: EMERGENCY MEDICINE

## 2019-01-01 PROCEDURE — 72125 CT NECK SPINE W/O DYE: CPT

## 2019-01-01 PROCEDURE — 77030008462 HC STPLR SKN PROX J&J -A

## 2019-01-01 PROCEDURE — 82565 ASSAY OF CREATININE: CPT

## 2019-01-01 PROCEDURE — 87641 MR-STAPH DNA AMP PROBE: CPT

## 2019-01-01 PROCEDURE — 36415 COLL VENOUS BLD VENIPUNCTURE: CPT

## 2019-01-01 PROCEDURE — 70450 CT HEAD/BRAIN W/O DYE: CPT

## 2019-01-01 PROCEDURE — 85730 THROMBOPLASTIN TIME PARTIAL: CPT

## 2019-01-01 PROCEDURE — 97161 PT EVAL LOW COMPLEX 20 MIN: CPT

## 2019-01-01 PROCEDURE — 74011636320 HC RX REV CODE- 636/320: Performed by: INTERNAL MEDICINE

## 2019-01-01 PROCEDURE — 80048 BASIC METABOLIC PNL TOTAL CA: CPT

## 2019-01-01 PROCEDURE — 74011250636 HC RX REV CODE- 250/636: Performed by: EMERGENCY MEDICINE

## 2019-01-01 PROCEDURE — 90471 IMMUNIZATION ADMIN: CPT | Performed by: EMERGENCY MEDICINE

## 2019-01-01 PROCEDURE — 81001 URINALYSIS AUTO W/SCOPE: CPT

## 2019-01-01 PROCEDURE — 90715 TDAP VACCINE 7 YRS/> IM: CPT | Performed by: EMERGENCY MEDICINE

## 2019-01-01 PROCEDURE — 74177 CT ABD & PELVIS W/CONTRAST: CPT

## 2019-01-01 PROCEDURE — 74011000258 HC RX REV CODE- 258: Performed by: INTERNAL MEDICINE

## 2019-01-01 PROCEDURE — 77030027138 HC INCENT SPIROMETER -A

## 2019-01-01 RX ORDER — SODIUM CHLORIDE 0.9 % (FLUSH) 0.9 %
10 SYRINGE (ML) INJECTION
Status: COMPLETED | OUTPATIENT
Start: 2019-01-01 | End: 2019-01-01

## 2019-01-01 RX ORDER — OMEPRAZOLE 40 MG/1
40 CAPSULE, DELAYED RELEASE ORAL DAILY
COMMUNITY
End: 2019-01-01

## 2019-01-01 RX ORDER — ALPRAZOLAM 0.25 MG/1
TABLET ORAL
COMMUNITY

## 2019-01-01 RX ADMIN — DIATRIZOATE MEGLUMINE AND DIATRIZOATE SODIUM 15 ML: 660; 100 LIQUID ORAL; RECTAL at 16:09

## 2019-01-01 RX ADMIN — IOPAMIDOL 100 ML: 755 INJECTION, SOLUTION INTRAVENOUS at 16:08

## 2019-01-01 RX ADMIN — TETANUS TOXOID, REDUCED DIPHTHERIA TOXOID AND ACELLULAR PERTUSSIS VACCINE, ADSORBED 0.5 ML: 5; 2.5; 8; 8; 2.5 SUSPENSION INTRAMUSCULAR at 19:41

## 2019-01-01 RX ADMIN — SODIUM CHLORIDE 100 ML: 900 INJECTION, SOLUTION INTRAVENOUS at 16:09

## 2019-01-01 RX ADMIN — Medication 10 ML: at 16:09

## 2019-02-04 NOTE — PERIOP NOTES
Patient verified name and . Order for consent  found in EHR and matches case posting; patient verified. Notified Jhonlissa Krishna in [facility] that has pacemaker/defibrillator. Anesthesia review cardiology records. Pt denies any shocks from defibrillator ever. Representative not needed on DOS per anesthesia guidelines. Type 3 surgery, Joint assessment complete. Labs per surgeon: cbc,bmp,pt,ptt,ua ; results within limits, mssa pending. Labs per anesthesia protocol: none. EKG: dated 18: anesthesia review. Echo dated 17: anesthesia review. MRSA/MSSA swab collected; pharmacy to review and dose antibiotic as appropriate. Hibiclens and instructions to return bottle on DOS given per hospital policy. Patient provided with handouts including Guide to Surgery, Pain Management, Hand Hygiene, Blood Transfusion Education, and [facility] Brochure. Patient answered medical/surgical history questions at their best of ability. All prior to admission medications documented in [facility]. Original medication prescription bottle not visualized during patient appointment. Patient instructed to hold all vitamins 7 days prior to surgery and NSAIDS 5 days prior to surgery. Prescription medications to hold: none. Patient instructed to continue previous medications as prescribed prior to surgery and to take the following medications the day of surgery according to anesthesia guidelines with a small sip of water: xanax, amlodipine, duloxetine, omeprazole, aspirin 81 mg.  Bring omeprazole. Patient teach back successful and patient demonstrates knowledge of instruction.

## 2019-02-04 NOTE — ADVANCED PRACTICE NURSE
Total Joint Surgery Preoperative Chart Review Patient ID: Vanita Yo [id number] 
77 y.o. 
1935 Surgeon: Dr. Shashi Granados Date of Surgery: 2/26/2019 Procedure: Total Right Knee Arthroplasty Primary Care Physician: Avelina Cardenas [phone] Specialty Physician(s):   
 
Subjective:  
Vanita Yo is a 82 y.o. WHITE OR  female who presents for preoperative evaluation for Total Right Knee arthroplasty. This is a preoperative chart review note based on data collected by the nurse at the surgical Pre-Assessment visit. Past Medical History:  
Diagnosis Date  Anemia   
 no recent episodes  Anxiety   
 managed with medication  Arthritis   
 generalized  Bundle branch block, left   
 managed with medication  CAD (coronary artery disease) 2004  
 x 1 stent  Cardiomyopathy ([facility] [address] EF 32% 2017 per cardiologist note 11-7-18  Chicken pox  Chronic systolic CHF (congestive heart failure) ([facility] [address] 3/13/2017  Claustrophobia  Depression   
 managed with medication  Diverticulitis   
 resolved  Elevated cholesterol   
 managed with medication  H/O: whooping cough  Hiatal hernia   
 with bleeding, surgically repaired  History of panic attacks  Hypertension   
 managed with medication  Measles  Mumps  Murmur, cardiac   
 echo dated 1-5-17  NSAID sensitivity  Post-menopausal   
 PUD (peptic ulcer disease) bleeding ulcers, cannot take NSAIDS  Pyloric ulcer associated with Helicobacter pylori 3744 Past Surgical History:  
Procedure Laterality Date  HX BREAST BIOPSY Left Benign cyst  
 HX CATARACT REMOVAL Bilateral 2011  HX GYN Right 2018  
 right ovarian mass resection  HX HEART CATHETERIZATION  2016 STENT X 2  
 HX HERNIA REPAIR  2011 Hiatal   
 HX HIP REPLACEMENT Right  HX KNEE ARTHROSCOPY Right 2007  
 right  HX OTHER SURGICAL  03/2017 ICD moniter inserted  HX PACEMAKER    
  NEUROLOGICAL PROCEDURE UNLISTED    
 back surgery [facility]  2010/2004 PCI to LAD x1 Family History Problem Relation Age of Onset  Hypertension Father  Breast Cancer Mother 55  Cancer Mother  Colon Cancer Neg Hx   
 Ovarian Cancer Neg Hx Social History Tobacco Use  Smoking status: Never Smoker  Smokeless tobacco: Never Used Substance Use Topics  Alcohol use: Yes Comment: occassional  
   
Prior to Admission medications Medication Sig Start Date End Date Taking? Authorizing Provider ALPRAZolam (XANAX) 0.25 mg tablet Take  by mouth two (2) times daily as needed for Anxiety. Yes Provider, Historical  
omeprazole (PRILOSEC) 40 mg capsule Take 40 mg by mouth daily. Yes Provider, Historical  
amLODIPine (NORVASC) 5 mg tablet Take 1 Tab by mouth daily. 12/18/18   Breann Best MD  
losartan (COZAAR) 25 mg tablet Take 2 Tabs by mouth every morning. 12/18/18   Tiffany Arora MD  
levomefolate-algal oil (DEPLIN, ALGAL OIL,) 15-90.314 mg cap Take  by mouth daily. Provider, Historical  
DULoxetine (CYMBALTA) 30 mg capsule Take 30 mg by mouth daily. Provider, Historical  
multivitamin (ONE A DAY) tablet Take 1 Tab by mouth daily. Provider, Historical  
brexpiprazole (REXULTI) 1 mg tab tablet Take 0.5 mg by mouth every evening. Provider, Historical  
hydroCHLOROthiazide (HYDRODIURIL) 12.5 mg tablet Take 1 Tab by mouth daily. 5/8/18   Breann Best MD  
aspirin 81 mg chewable tablet Take 1 Tab by mouth daily. 12/7/16   Berenice Mccloud PA  
nitroglycerin (NITROSTAT) 0.4 mg SL tablet 1 Tab by SubLINGual route every five (5) minutes as needed for Chest Pain. 12/2/16   Rasheeda Nunes MD  
DULoxetine (CYMBALTA) 60 mg capsule Take 60 mg by mouth every morning. Provider, Historical  
zolpidem (AMBIEN) 10 mg tablet Take 5 mg by mouth nightly. Other, MD Ann aMrie  
 
Allergies Allergen Reactions  Latex Rash  Lactose Diarrhea  Adhesive Rash  Atorvastatin Other (comments) Muscle aches  Augmentin [Amoxicillin-Pot Clavulanate] Other (comments) GI Upset  Codeine Hives  Ezetimibe Other (comments) Muscle aches  Fluoxetine Cough  Percocet [Oxycodone-Acetaminophen] Hives  Potassium Nausea Only  Pravastatin Myalgia  Prednisone Nausea and Vomiting  Simvastatin Unknown (comments) All statins per pt  Wellbutrin [Bupropion] Unknown (comments) Objective:  
 
Physical Exam:  
Patient Vitals for the past 24 hrs: 
 Temp Pulse Resp BP SpO2  
02/04/19 1225 97.4 °F (36.3 °C) 93 16 170/77 95 % ECG:   
EKG Results None Data Review:  
Labs:  
Recent Results (from the past 24 hour(s)) CBC WITH AUTOMATED DIFF Collection Time: 02/04/19 10:45 AM  
Result Value Ref Range WBC 9.4 4.3 - 11.1 K/uL  
 RBC 4.96 4.05 - 5.2 M/uL  
 HGB 14.5 11.7 - 15.4 g/dL HCT 44.8 35.8 - 46.3 % MCV 90.3 79.6 - 97.8 FL  
 MCH 29.2 26.1 - 32.9 PG  
 MCHC 32.4 31.4 - 35.0 g/dL  
 RDW 14.0 11.9 - 14.6 % PLATELET 354 120 - 727 K/uL MPV 9.8 9.4 - 12.3 FL ABSOLUTE NRBC 0.00 0.0 - 0.2 K/uL  
 DF AUTOMATED NEUTROPHILS 68 43 - 78 % LYMPHOCYTES 18 13 - 44 % MONOCYTES 10 4.0 - 12.0 % EOSINOPHILS 3 0.5 - 7.8 % BASOPHILS 1 0.0 - 2.0 % IMMATURE GRANULOCYTES 1 0.0 - 5.0 %  
 ABS. NEUTROPHILS 6.4 1.7 - 8.2 K/UL  
 ABS. LYMPHOCYTES 1.6 0.5 - 4.6 K/UL  
 ABS. MONOCYTES 1.0 0.1 - 1.3 K/UL  
 ABS. EOSINOPHILS 0.3 0.0 - 0.8 K/UL  
 ABS. BASOPHILS 0.1 0.0 - 0.2 K/UL  
 ABS. IMM. GRANS. 0.1 0.0 - 0.5 K/UL METABOLIC PANEL, BASIC Collection Time: 02/04/19 10:45 AM  
Result Value Ref Range Sodium 139 136 - 145 mmol/L Potassium 3.3 (L) 3.5 - 5.1 mmol/L Chloride 101 98 - 107 mmol/L  
 CO2 28 21 - 32 mmol/L Anion gap 10 mmol/L Glucose 105 (H) 65 - 100 mg/dL BUN 14 8 - 23 MG/DL Creatinine 1.07 (H) 0.6 - 1.0 MG/DL  
 GFR est AA >60 >60 ml/min/1.73m2 GFR est non-AA 52 ml/min/1.73m2 Calcium 9.6 8.3 - 10.4 MG/DL PROTHROMBIN TIME + INR Collection Time: 02/04/19 10:45 AM  
Result Value Ref Range Prothrombin time 11.7 11.7 - 14.5 sec INR 0.8 PTT Collection Time: 02/04/19 10:45 AM  
Result Value Ref Range aPTT 30.0 24.7 - 39.8 SEC URINALYSIS W/ RFLX MICROSCOPIC Collection Time: 02/04/19 10:45 AM  
Result Value Ref Range Color YELLOW Appearance CLOUDY Specific gravity 1.016 1.001 - 1.023    
 pH (UA) 6.0 5.0 - 9.0 Protein TRACE (A) NEG mg/dL Glucose NEGATIVE  mg/dL Ketone NEGATIVE  NEG mg/dL Bilirubin NEGATIVE  NEG Blood NEGATIVE  NEG Urobilinogen 0.2 0.2 - 1.0 EU/dL Nitrites NEGATIVE  NEG Leukocyte Esterase TRACE (A) NEG    
 WBC 0-3 0 /hpf  
 RBC 0-3 0 /hpf Epithelial cells 0-3 0 /hpf Bacteria 0 0 /hpf Casts 3-5 0 /lpf Problem List: 
) Patient Active Problem List  
Diagnosis Code  Status post hip replacement C31.027  Hypertension I10  
 CAD (coronary artery disease) I25.10  Left bundle branch block I44.7  PVC's (premature ventricular contractions) I49.3  Statin intolerance Z78.9  Cardiomyopathy ([address] I42.9  Chronic systolic CHF (congestive heart failure) ([facility]) I50.22  Biventricular ICD (implantable cardioverter-defibrillator) in place Z95.810  Pleural effusion on right J90  
 Pelvic mass in female R19.00  Syncope R55  
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          
Recommend continuous saturation monitoring hours of sleep, during hospitalization. Signed By: BERNARDA Bassett February 4, 2019

## 2019-02-04 NOTE — PROGRESS NOTES
Lucy Vee : 1935(83 y.o.) [address] at [address], [place] [address]. [phone] Physical Therapy Prehab Plan of Treatment and Evaluation Summary:2019 ICD-10: Treatment Diagnosis:  
· Pain in Right Knee (M25.561) · Stiffness of Right Knee, Not elsewhere classified (M25.661) · Difficulty in walking, Not elsewhere classified (R26.2) Precautions/Allergies:  
Latex; Lactose; Adhesive; Atorvastatin; Augmentin [amoxicillin-pot clavulanate]; Codeine; Ezetimibe; Fluoxetine; Percocet [oxycodone-acetaminophen]; Potassium; Pravastatin; Prednisone; Simvastatin; and Wellbutrin [bupropion]  MEDICAL/REFERRING DIAGNOSIS: 
Unilateral primary osteoarthritis, right knee [M17.11] REFERRING PHYSICIAN: Karly Deng MD 
DATE OF SURGERY: 19 Assessment:  
Comments:  Pt. Had a right stefani in the past.  [facility] She plans to hire a caregiver to be with her at home for as long as she needs one. PROBLEM LIST (Impacting functional limitations): Ms. Timmy Marquis presents with the following right lower extremity(s) problems: 1. Strength 2. Range of Motion 3. Home Exercise Program 
4. Pain INTERVENTIONS PLANNED: 
1. Home Exercise Program 
2. Educational Discussion TREATMENT PLAN: Effective Dates: 2019 TO 2019. Frequency/Duration: Patient to continue to perform home exercise program at least twice per day up until her surgery. GOALS: (Goals have been discussed and agreed upon with patient.) Discharge Goals: Time Frame: 1 Day 1. Patient will demonstrate independence with a home exercise program designed to increase strength, range of motion and pain control to minimize functional deficits and optimize patient for total joint replacement. Rehabilitation Potential For Stated Goals: Good Regarding Lisa Nunez's therapy, I certify that the treatment plan above will be carried out by a therapist or under their direction. Thank you for this referral, 
Macho Shoulders, PT     
    
 
 
HISTORY:  
Present Symptoms: 
Pain Intensity 1: (7 at worst) Pain Location 1: Knee History of Present Injury/Illness (Reason for Referral): 
Medical/Referring Diagnosis: Unilateral primary osteoarthritis, right knee [M17.11] Past Medical History/Comorbidities: Ms. Dhara Loja  has a past medical history of Anemia, Anxiety, Arthritis, Bundle branch block, left, CAD (coronary artery disease) (2004), Chicken pox, Chronic systolic CHF (congestive heart failure) ([facility] [address] (3/13/2017), Claustrophobia, Depression, Diverticulitis, Elevated cholesterol, H/O: whooping cough, Hiatal hernia, Hypertension, Measles, Mumps, NSAID sensitivity, Post-menopausal, PUD (peptic ulcer disease), and Pyloric ulcer associated with Helicobacter pylori (0247). Ms. Dhara Loja  has a past surgical history that includes hx cataract removal (Bilateral, 2011); hx knee arthroscopy (Right, 2007); hx hip replacement (Right); hx hernia repair (2011); pr neurological procedure unlisted; hx breast biopsy (Left); pr left heart cath,percutaneous (2010/2004); hx heart catheterization (2016); hx other surgical (03/2017); hx pacemaker; and hx gyn (Right, 2018). Social History/Living Environment:  
Home Environment: Private residence # Steps to Enter: 1 Rails to Enter: No 
One/Two Story Residence: One story Living Alone: Yes Support Systems: Friends \ neighbors, Home care staff Patient Expects to be Discharged to[de-identified] Private residence Current DME Used/Available at Home: Cane, straight, Shower chair, Raised toilet seat Tub or Shower Type: Shower Work/Activity: 
retired Dominant Side: LEFT Current Medications:  See Pre-assessment nursing note Number of Personal Factors/Comorbidities that affect the Plan of Care: 3+: HIGH COMPLEXITY EXAMINATION:  
ADLs (Current Functional Status):  
Ambulation: 
[] Independent [] Walk Indoors Only 
[] Walk Outdoors [x] Use Assistive Device [] Use Wheelchair Only Dressing: 
[x] Independent Requires Assistance from Someone for: 
[] Sock/Shoes 
[] Pants 
[] Everything Bathing/Showering:  
[x] Independent 
[] Requires Assistance from Someone 
[] Sponge Bath Only Household Activities: 
[] Routine house and yard work [x] Light Housework Only 
[] None Observation/Orthostatic Postural Assessment:  
Exceptions to WDLGenu valgus right, Trunk flexion, Rounded shoulders ROM/Flexibility:  
Gross Assessment: Yes AROM: Generally decreased, functional(left LE) RLE Assessment RLE Assessment (WDL): Exceptions to WDL 
RLE AROM 
R Knee Flexion: 115 R Knee Extension: 2 Strength:  
Gross Assessment: Yes 
Strength: Generally decreased, functional(left LE) RLE Strength R Knee Flexion: 4 
R Knee Extension: 4 Functional Mobility:   
Gross Assessment: Yes 
 
Gait Description (WDL): Exceptions to [facility] Stand to Sit: Independent, Additional time Sit to Stand: Independent, Additional time Distance (ft): 250 Feet (ft) Ambulation - Level of Assistance: Independent Speed/Tiffanie: Delayed Step Length: Left shortened;Right shortened Stance: Right decreased Gait Abnormalities: Antalgic Balance:   
Sitting: Intact Standing: Impaired, Intact Standing - Static: Good Standing - Dynamic : Fair Body Structures Involved: 1. Bones 2. Joints 3. Muscles 4. Ligaments Body Functions Affected: 1. Movement Related Activities and Participation Affected: 1. Mobility Number of elements that affect the Plan of Care: 3: MODERATE COMPLEXITY CLINICAL PRESENTATION:  
Presentation: Stable and uncomplicated: LOW COMPLEXITY CLINICAL DECISION MAKING:  
Outcome Measure: Tool Used: Lower Extremity Functional Scale (LEFS) Score:  Initial: 34/80 Most Recent: X/80 (Date: -- ) Interpretation of Score: 20 questions each scored on a 5 point scale with 0 representing \"extreme difficulty or unable to perform\" and 4 representing \"no difficulty\". The lower the score, the greater the functional disability. 80/80 represents no disability. Minimal detectable change is 9 points. Medical Necessity:  
· Ms. Nathalie Santos is expected to optimize her lower extremity strength and ROM in preparation for joint replacement surgery. Reason for Services/Other Comments: · Achieve baseline assesment of musculoskeletal system, functional mobility and home environment. , educate in PT HEP in preparation for surgery, educate in hospital plan of care. Use of outcome tool(s) and clinical judgement create a POC that gives a: Clear prediction of patient's progress: LOW COMPLEXITY  
TREATMENT:  
Treatment/Session Assessment:  Patient was instructed in PT- HEP to increase strength and ROM in LEs. Answered all questions. · Post session pain:  Knee pain · Compliance with Program/Exercises: compliant most of the time. Total Treatment Duration: PT Patient Time In/Time Out Time In: 1030 Time Out: 1045 Smith Lennox, PT

## 2019-02-04 NOTE — PERIOP NOTES
Recent Results (from the past 12 hour(s)) CBC WITH AUTOMATED DIFF Collection Time: 02/04/19 10:45 AM  
Result Value Ref Range WBC 9.4 4.3 - 11.1 K/uL  
 RBC 4.96 4.05 - 5.2 M/uL  
 HGB 14.5 11.7 - 15.4 g/dL HCT 44.8 35.8 - 46.3 % MCV 90.3 79.6 - 97.8 FL  
 MCH 29.2 26.1 - 32.9 PG  
 MCHC 32.4 31.4 - 35.0 g/dL  
 RDW 14.0 11.9 - 14.6 % PLATELET 950 677 - 426 K/uL MPV 9.8 9.4 - 12.3 FL ABSOLUTE NRBC 0.00 0.0 - 0.2 K/uL  
 DF AUTOMATED NEUTROPHILS 68 43 - 78 % LYMPHOCYTES 18 13 - 44 % MONOCYTES 10 4.0 - 12.0 % EOSINOPHILS 3 0.5 - 7.8 % BASOPHILS 1 0.0 - 2.0 % IMMATURE GRANULOCYTES 1 0.0 - 5.0 %  
 ABS. NEUTROPHILS 6.4 1.7 - 8.2 K/UL  
 ABS. LYMPHOCYTES 1.6 0.5 - 4.6 K/UL  
 ABS. MONOCYTES 1.0 0.1 - 1.3 K/UL  
 ABS. EOSINOPHILS 0.3 0.0 - 0.8 K/UL  
 ABS. BASOPHILS 0.1 0.0 - 0.2 K/UL  
 ABS. IMM. GRANS. 0.1 0.0 - 0.5 K/UL METABOLIC PANEL, BASIC Collection Time: 02/04/19 10:45 AM  
Result Value Ref Range Sodium 139 136 - 145 mmol/L Potassium 3.3 (L) 3.5 - 5.1 mmol/L Chloride 101 98 - 107 mmol/L  
 CO2 28 21 - 32 mmol/L Anion gap 10 mmol/L Glucose 105 (H) 65 - 100 mg/dL BUN 14 8 - 23 MG/DL Creatinine 1.07 (H) 0.6 - 1.0 MG/DL  
 GFR est AA >60 >60 ml/min/1.73m2 GFR est non-AA 52 ml/min/1.73m2 Calcium 9.6 8.3 - 10.4 MG/DL PROTHROMBIN TIME + INR Collection Time: 02/04/19 10:45 AM  
Result Value Ref Range Prothrombin time 11.7 11.7 - 14.5 sec INR 0.8 PTT Collection Time: 02/04/19 10:45 AM  
Result Value Ref Range aPTT 30.0 24.7 - 39.8 SEC URINALYSIS W/ RFLX MICROSCOPIC Collection Time: 02/04/19 10:45 AM  
Result Value Ref Range Color YELLOW Appearance CLOUDY Specific gravity 1.016 1.001 - 1.023    
 pH (UA) 6.0 5.0 - 9.0 Protein TRACE (A) NEG mg/dL Glucose NEGATIVE  mg/dL Ketone NEGATIVE  NEG mg/dL Bilirubin NEGATIVE  NEG Blood NEGATIVE  NEG Urobilinogen 0.2 0.2 - 1.0 EU/dL Nitrites NEGATIVE  NEG Leukocyte Esterase TRACE (A) NEG    
 WBC 0-3 0 /hpf  
 RBC 0-3 0 /hpf Epithelial cells 0-3 0 /hpf Bacteria 0 0 /hpf Casts 3-5 0 /lpf

## 2019-02-04 NOTE — PROGRESS NOTES
02/04/19 1030 Oxygen Therapy O2 Sat (%) 96 % Pulse via Oximetry 71 beats per minute O2 Device Room air Pre-Treatment Breath Sounds Bilateral Clear;Diminished Pre FEV1 (liters) 1.6 liters % Predicted 71 Incentive Spirometry Treatment Actual Volume (ml) 1000 ml Initial respiratory Assessment completed with pt. Pt was interviewed and evaluated in Joint camp prior to surgery. Patient ID: Oswaldo Macedo [id number] 
62 y.o. 
1935 Surgeon: Dr. Will Heller Date of Surgery: 2/26/2019 Procedure: Total Right Knee Arthroplasty Primary Care Physician: Breanne Hines [phone] Specialists:  
                    
          Pt instructed in the use of Incentive Spirometry. Pt instructed to bring Incentive Spirometer back on date of surgery & to start using Is upon return to pt room. Pt taught proper cough technique History of smoking:   NONE Quit date:        
 
Secondhand smoke:PARENTS Past procedures with Oxygen desaturation:NON-REBREATHER 9/16/2011 IN [place] Past Medical History:  
Diagnosis Date  Anemia   
 no recent episodes  Anxiety   
 managed with medication  Arthritis   
 generalized  Bundle branch block, left   
 managed with medication  CAD (coronary artery disease) 2004  
 x 1 stent  Cardiomyopathy ([facility] [address] EF 32% 2017 per cardiologist note 11-7-18  Chicken pox  Chronic systolic CHF (congestive heart failure) ([facility] [address] 3/13/2017  Claustrophobia  Depression   
 managed with medication  Diverticulitis   
 resolved  Elevated cholesterol   
 managed with medication  H/O: whooping cough  Hiatal hernia   
 with bleeding, surgically repaired  History of panic attacks  Hypertension   
 managed with medication  Measles  Mumps  Murmur, cardiac   
 echo dated 1-5-17  NSAID sensitivity  Post-menopausal   
 PUD (peptic ulcer disease) bleeding ulcers, cannot take NSAIDS  Pyloric ulcer associated with Helicobacter pylori 6204 HX OF WHOOPING COUGH AS A CHILD Respiratory history:DENIES SOB Respiratory meds:  NA 
 
 
                                
FAMILY PRESENT:              
                                            NO 
 
                                   
PAST SLEEP STUDY:                       DENIES 
HX OF RUSSELL:                                         DENIES 
                                
 
RUSSELL assessment:     HX OF INSOMNIA 
                                        SLEEPS ON SIDE     &        BACK PHYSICAL EXAM Body mass index is 25.84 kg/m². Visit Vitals /77 (BP 1 Location: Left arm, BP Patient Position: At rest) Pulse 93 Temp 97.4 °F (36.3 °C) Resp 16 Ht 5' 7\" (1.702 m) Wt 74.8 kg (165 lb) SpO2 95% BMI 25.84 kg/m² Neck circumference:  35.5    cm Loud snoring:     DOESN'T KNOW Witnessed apnea or wakening gasping or choking:,             DENIES, Awakens with headaches:                                                  DENIES Morning or daytime tiredness/ sleepiness:                  2 NAPS PER DAY FOR 1 HOUR 
                                                                                     TIRED Dry mouth or sore throat in morning:                YES Hutchins stage:  4 SACS score:6 
 
STOP/BAN 
 
                         
CPAP:                       NONE 
                           
     CONT SAT HS Referrals: DECLINED Pt. Phone Number:

## 2019-02-04 NOTE — PERIOP NOTES
Recent Results (from the past 12 hour(s)) CBC WITH AUTOMATED DIFF Collection Time: 02/04/19 10:45 AM  
Result Value Ref Range WBC 9.4 4.3 - 11.1 K/uL  
 RBC 4.96 4.05 - 5.2 M/uL  
 HGB 14.5 11.7 - 15.4 g/dL HCT 44.8 35.8 - 46.3 % MCV 90.3 79.6 - 97.8 FL  
 MCH 29.2 26.1 - 32.9 PG  
 MCHC 32.4 31.4 - 35.0 g/dL  
 RDW 14.0 11.9 - 14.6 % PLATELET 040 746 - 366 K/uL MPV 9.8 9.4 - 12.3 FL ABSOLUTE NRBC 0.00 0.0 - 0.2 K/uL  
 DF AUTOMATED NEUTROPHILS 68 43 - 78 % LYMPHOCYTES 18 13 - 44 % MONOCYTES 10 4.0 - 12.0 % EOSINOPHILS 3 0.5 - 7.8 % BASOPHILS 1 0.0 - 2.0 % IMMATURE GRANULOCYTES 1 0.0 - 5.0 %  
 ABS. NEUTROPHILS 6.4 1.7 - 8.2 K/UL  
 ABS. LYMPHOCYTES 1.6 0.5 - 4.6 K/UL  
 ABS. MONOCYTES 1.0 0.1 - 1.3 K/UL  
 ABS. EOSINOPHILS 0.3 0.0 - 0.8 K/UL  
 ABS. BASOPHILS 0.1 0.0 - 0.2 K/UL  
 ABS. IMM. GRANS. 0.1 0.0 - 0.5 K/UL METABOLIC PANEL, BASIC Collection Time: 02/04/19 10:45 AM  
Result Value Ref Range Sodium 139 136 - 145 mmol/L Potassium 3.3 (L) 3.5 - 5.1 mmol/L Chloride 101 98 - 107 mmol/L  
 CO2 28 21 - 32 mmol/L Anion gap 10 mmol/L Glucose 105 (H) 65 - 100 mg/dL BUN 14 8 - 23 MG/DL Creatinine 1.07 (H) 0.6 - 1.0 MG/DL  
 GFR est AA >60 >60 ml/min/1.73m2 GFR est non-AA 52 ml/min/1.73m2 Calcium 9.6 8.3 - 10.4 MG/DL PROTHROMBIN TIME + INR Collection Time: 02/04/19 10:45 AM  
Result Value Ref Range Prothrombin time 11.7 11.7 - 14.5 sec INR 0.8 PTT Collection Time: 02/04/19 10:45 AM  
Result Value Ref Range aPTT 30.0 24.7 - 39.8 SEC URINALYSIS W/ RFLX MICROSCOPIC Collection Time: 02/04/19 10:45 AM  
Result Value Ref Range Color YELLOW Appearance CLOUDY Specific gravity 1.016 1.001 - 1.023    
 pH (UA) 6.0 5.0 - 9.0 Protein TRACE (A) NEG mg/dL Glucose NEGATIVE  mg/dL Ketone NEGATIVE  NEG mg/dL Bilirubin NEGATIVE  NEG Blood NEGATIVE  NEG Urobilinogen 0.2 0.2 - 1.0 EU/dL Nitrites NEGATIVE  NEG Leukocyte Esterase TRACE (A) NEG    
 WBC 0-3 0 /hpf  
 RBC 0-3 0 /hpf Epithelial cells 0-3 0 /hpf Bacteria 0 0 /hpf Casts 3-5 0 /lpf

## 2019-04-01 NOTE — ED PROVIDER NOTES
47-year-old lady presents with concerns about falling while outside walking. She says she fell back and hit her head. She denies loss of consciousness and that she has no weakness, numbness, or tingling. She denies any other injury. She has been ambulatory since then. Elements of this note were created using speech recognition software. As such, errors of speech recognition may be present. Past Medical History:  
Diagnosis Date  Anemia   
 no recent episodes  Anxiety   
 managed with medication  Arthritis   
 generalized  Bundle branch block, left   
 managed with medication  CAD (coronary artery disease) 2004  
 x 1 stent  Cardiomyopathy ([facility] [address] EF 32% 2017 per cardiologist note 11-7-18  Chicken pox  Chronic systolic CHF (congestive heart failure) ([facility] [address] 3/13/2017  Claustrophobia  Depression   
 managed with medication  Diverticulitis   
 resolved  Elevated cholesterol   
 managed with medication  H/O: whooping cough  Hiatal hernia   
 with bleeding, surgically repaired  History of panic attacks  Hypertension   
 managed with medication  Measles  Mumps  Murmur, cardiac   
 echo dated 1-5-17  NSAID sensitivity  Post-menopausal   
 PUD (peptic ulcer disease) bleeding ulcers, cannot take NSAIDS  Pyloric ulcer associated with Helicobacter pylori 9318 Past Surgical History:  
Procedure Laterality Date  HX BREAST BIOPSY Left Benign cyst  
 HX CATARACT REMOVAL Bilateral 2011  HX GYN Right 2018  
 right ovarian mass resection  HX HEART CATHETERIZATION  2016 STENT X 2  
 HX HERNIA REPAIR  2011 Hiatal   
 HX HIP REPLACEMENT Right  HX KNEE ARTHROSCOPY Right 2007  
 right  HX OTHER SURGICAL  03/2017 ICD moniter inserted  HX PACEMAKER    
 NEUROLOGICAL PROCEDURE UNLISTED    
 back surgery [facility]  2010/2004 PCI to LAD x1 Family History: Problem Relation Age of Onset  Hypertension Father  Breast Cancer Mother 55  Cancer Mother  Colon Cancer Neg Hx   
 Ovarian Cancer Neg Hx Social History Socioeconomic History  Marital status: SINGLE Spouse name: Not on file  Number of children: Not on file  Years of education: Not on file  Highest education level: Not on file Occupational History  Not on file Social Needs  Financial resource strain: Not on file  Food insecurity:  
  Worry: Not on file Inability: Not on file  Transportation needs:  
  Medical: Not on file Non-medical: Not on file Tobacco Use  Smoking status: Never Smoker  Smokeless tobacco: Never Used Substance and Sexual Activity  Alcohol use: Yes Comment: occassional  
 Drug use: No  
 Sexual activity: Not Currently Lifestyle  Physical activity:  
  Days per week: Not on file Minutes per session: Not on file  Stress: Not on file Relationships  Social connections:  
  Talks on phone: Not on file Gets together: Not on file Attends [employer] service: Not on file Active member of club or organization: Not on file Attends meetings of clubs or organizations: Not on file Relationship status: Not on file  Intimate partner violence:  
  Fear of current or ex partner: Not on file Emotionally abused: Not on file Physically abused: Not on file Forced sexual activity: Not on file Other Topics Concern  Not on file Social History Narrative  Not on file ALLERGIES: Latex; Lactose; Adhesive; Atorvastatin; Augmentin [amoxicillin-pot clavulanate]; Codeine; Ezetimibe; Fluoxetine; Percocet [oxycodone-acetaminophen]; Potassium; Pravastatin; Prednisone; Simvastatin; and Wellbutrin [bupropion] Review of Systems Constitutional: Negative for chills and fever. Musculoskeletal: Negative for arthralgias, gait problem, joint swelling and myalgias. Skin: Positive for wound. Negative for color change. Neurological: Negative for dizziness and headaches. Vitals:  
 04/01/19 1847 04/01/19 1850 04/01/19 1851 04/01/19 1852 BP: 160/80 173/82 Pulse: 99 Resp:    14 Temp:    98.4 °F (36.9 °C) SpO2: 98%  93% Weight: 72.6 kg (160 lb) Height: 5' 7\" (1.702 m) Physical Exam  
Constitutional: She is oriented to person, place, and time. She appears well-developed and well-nourished. HENT:  
Head: Normocephalic. 1 cm posterior scalp laceration Musculoskeletal: Normal range of motion. She exhibits no tenderness or deformity. Neurological: She is alert and oriented to person, place, and time. Skin: Skin is warm and dry. Nursing note and vitals reviewed. MDM Number of Diagnoses or Management Options Diagnosis management comments: I cleaned and irrigated the wound with wound  and saline. I was able to place two staples. Did not use any anesthetic and she tolerated it well. CT of her head is negative I will discharge her home. Procedures

## 2019-04-01 NOTE — ED NOTES
I have reviewed discharge instructions with the patient. The patient verbalized understanding. Patient left ED via Discharge Method: wheelchair to Home with  daughter). Opportunity for questions and clarification provided. Patient given 0 scripts. To continue your aftercare when you leave the hospital, you may receive an automated call from our care team to check in on how you are doing. This is a free service and part of our promise to provide the best care and service to meet your aftercare needs.  If you have questions, or wish to unsubscribe from this service please call [phone]. Thank you for Choosing our [address] [place] [address] Emergency Department.

## 2019-04-04 NOTE — THERAPY DISCHARGE
Lisa Nunez  :   [place]  Secondary: Jb Germain at [address], [address], [place] [address].  [phone]   [phone]          OUTPATIENT PHYSICAL THERAPY:Discontinuation Summary  19    ICD-10: Treatment Diagnosis: Low back pain (M54.5)  Precautions/Allergies:   Latex; Lactose; Adhesive; Atorvastatin; Augmentin [amoxicillin-pot clavulanate]; Codeine; Ezetimibe; Fluoxetine; Percocet [oxycodone-acetaminophen]; Potassium; Pravastatin; Prednisone; Simvastatin; and Wellbutrin [bupropion]   Fall Risk Score: 1 (? 5 = High Risk)  MD Orders: Evaluate and treat, pool/water aquatics for lower back pain MEDICAL/REFERRING DIAGNOSIS:  back   DATE OF ONSET: Chronic condition  REFERRING PHYSICIAN: Butch Palomino., *  RETURN PHYSICIAN APPOINTMENT: TBD     INITIAL ASSESSMENT:  Ms. Aruna Vences attended 8 visits of PT with several cancellations and her final visit a no show. No objective data was taken at time of discharge.

## 2019-04-23 NOTE — ED TRIAGE NOTES
Patient arrives via EMS from "[facility] Patient states she rolled out of bed and was found by caregiver on floor. Complains of neck and back pain.

## 2019-04-23 NOTE — ED NOTES
I have reviewed discharge instructions with the patient and caregiver. The patient and caregiver verbalized understanding. Patient left ED via Discharge Method: stretcher to Home with (insert name of family/friend, self, transport BR[place]). Opportunity for questions and clarification provided. Patient given 0 scripts. To continue your aftercare when you leave the hospital, you may receive an automated call from our care team to check in on how you are doing. This is a free service and part of our promise to provide the best care and service to meet your aftercare needs.  If you have questions, or wish to unsubscribe from this service please call [phone]. Thank you for Choosing our [facility].

## 2019-04-23 NOTE — DISCHARGE INSTRUCTIONS
Patient Education        Neck Strain: Care Instructions  Your Care Instructions    You have strained the muscles and ligaments in your neck. A sudden, awkward movement can strain the neck. This often occurs with falls or car accidents or during certain sports. Everyday activities like working on a computer or sleeping can also cause neck strain if they force you to hold your neck in an awkward position for a long time. It is common for neck pain to get worse for a day or two after an injury, but it should start to feel better after that. You may have more pain and stiffness for several days before it gets better. This is expected. It may take a few weeks or longer for it to heal completely. Good home treatment can help you get better faster and avoid future neck problems. Follow-up care is a key part of your treatment and safety. Be sure to make and go to all appointments, and call your doctor if you are having problems. It's also a good idea to know your test results and keep a list of the medicines you take. How can you care for yourself at home? · If you were given a neck brace (cervical collar) to limit neck motion, wear it as instructed for as many days as your doctor tells you to. Do not wear it longer than you were told to. Wearing a brace for too long can make neck stiffness worse and weaken the neck muscles. · You can try using heat or ice to see if it helps. ? Try using a heating pad on a low or medium setting for 15 to 20 minutes every 2 to 3 hours. Try a warm shower in place of one session with the heating pad. You can also buy single-use heat wraps that last up to 8 hours. ? You can also try an ice pack for 10 to 15 minutes every 2 to 3 hours. · Take pain medicines exactly as directed. ? If the doctor gave you a prescription medicine for pain, take it as prescribed. ? If you are not taking a prescription pain medicine, ask your doctor if you can take an over-the-counter medicine.   · Gently rub the area to relieve pain and help with blood flow. Do not massage the area if it hurts to do so. · Do not do anything that makes the pain worse. Take it easy for a couple of days. You can do your usual activities if they do not hurt your neck or put it at risk for more stress or injury. · Try sleeping on a special neck pillow. Place it under your neck, not under your head. Placing a tightly rolled-up towel under your neck while you sleep will also work. If you use a neck pillow or rolled towel, do not use your regular pillow at the same time. · To prevent future neck pain, do exercises to stretch and strengthen your neck and back. Learn how to use good posture, safe lifting techniques, and proper body mechanics. When should you call for help? Call 911 anytime you think you may need emergency care. For example, call if:    · You are unable to move an arm or a leg at all.   [facility] your doctor now or seek immediate medical care if:    · You have new or worse symptoms in your arms, legs, chest, belly, or buttocks. Symptoms may include:  ? Numbness or tingling. ? Weakness. ? Pain.     · You lose bladder or bowel control.    Watch closely for changes in your health, and be sure to contact your doctor if:    · You are not getting better as expected. Where can you learn more? Go to http://tammy-jesús.info/. Enter M253 in the search box to learn more about \"Neck Strain: Care Instructions. \"  Current as of: September 20, 2018  Content Version: 11.9  © 1103-6498 Healthwise[employer]. Care instructions adapted under license by [employer] (which disclaims liability or warranty for this information). If you have questions about a medical condition or this instruction, always ask your healthcare professional. [place] [address] any warranty or liability for your use of this information.          Patient Education        Preventing Falls: Care Instructions  Your Care Instructions    Getting around your home safely can be a challenge if you have injuries or health problems that make it easy for you to fall. Loose rugs and furniture in walkways are among the dangers for many older people who have problems walking or who have poor eyesight. People who have conditions such as arthritis, osteoporosis, or dementia also have to be careful not to fall. You can make your home safer with a few simple measures. Follow-up care is a key part of your treatment and safety. Be sure to make and go to all appointments, and call your doctor if you are having problems. It's also a good idea to know your test results and keep a list of the medicines you take. How can you care for yourself at home? Taking care of yourself  · You may get dizzy if you do not drink enough water. To prevent dehydration, drink plenty of fluids, enough so that your urine is light yellow or clear like water. Choose water and other caffeine-free clear liquids. If you have kidney, heart, or liver disease and have to limit fluids, talk with your doctor before you increase the amount of fluids you drink. · Exercise regularly to improve your strength, muscle tone, and balance. Walk if you can. Swimming may be a good choice if you cannot walk easily. · Have your vision and hearing checked each year or any time you notice a change. If you have trouble seeing and hearing, you might not be able to avoid objects and could lose your balance. · Know the side effects of the medicines you take. Ask your doctor or pharmacist whether the medicines you take can affect your balance. Sleeping pills or sedatives can affect your balance. · Limit the amount of alcohol you drink. Alcohol can impair your balance and other senses. · Ask your doctor whether calluses or corns on your feet need to be removed. If you wear loose-fitting shoes because of calluses or corns, you can lose your balance and fall.   · Talk to your doctor if you have numbness in your feet. Preventing falls at home  · Remove raised doorway thresholds, throw rugs, and clutter. Repair loose carpet or raised areas in the floor. · Move furniture and electrical cords to keep them out of walking paths. · Use nonskid floor wax, and wipe up spills right away, especially on ceramic tile floors. · If you use a walker or cane, put rubber tips on it. If you use crutches, clean the bottoms of them regularly with an abrasive pad, such as steel wool. · Keep your house well lit, especially [employer], and outside walkways. Use night-lights in areas such as hallways and bathrooms. Add extra light switches or use remote switches (such as switches that go on or off when you clap your hands) to make it easier to turn lights on if you have to get up during the night. · Install sturdy handrails on stairways. · Move items in your cabinets so that the things you use a lot are on the lower shelves (about waist level). · Keep a cordless phone and a flashlight with new batteries by your bed. If possible, put a phone in each of the main rooms of your house, or carry a cell phone in case you fall and cannot reach a phone. Or, you can wear a device around your neck or wrist. You push a button that sends a signal for help. · Wear low-heeled shoes that fit well and give your feet good support. Use footwear with nonskid soles. Check the heels and soles of your shoes for wear. Repair or replace worn heels or soles. · Do not wear socks without shoes on wood floors. · Walk on the grass when the sidewalks are slippery. If you live in an area that gets snow and ice in the winter, sprinkle salt on slippery steps and sidewalks. Preventing falls in the bath  · Install grab bars and nonskid mats inside and outside your shower or tub and near the toilet and sinks. · Use shower chairs and bath benches. · Use a hand-held shower head that will allow you to sit while showering.   · Get into a tub or shower by putting the weaker leg in first. Get out of a tub or shower with your strong side first.  · Repair loose toilet seats and consider installing a raised toilet seat to make getting on and off the toilet easier. · Keep your bathroom door unlocked while you are in the shower. Where can you learn more? Go to http://tammy-jesús.info/. Enter 0476 79 69 [id number] in the search box to learn more about \"Preventing Falls: Care Instructions. \"  Current as of: March 16, 2018  Content Version: 11.8  © 5839-1246 Healthwise, [employer]. Care instructions adapted under license by [employer] (which disclaims liability or warranty for this information). If you have questions about a medical condition or this instruction, always ask your healthcare professional. [address] any warranty or liability for your use of this information.

## 2019-04-24 NOTE — ED PROVIDER NOTES
Slid oob and onto floor. No LOC. No other changes. Remains alert and aggravated at delays The history is provided by the patient. Fall The accident occurred 1 to 2 hours ago. The fall occurred from a bed. She landed on hard floor. There was no blood loss. The pain is present in the neck. The pain is mild. Pertinent negatives include no visual change, no numbness, no bowel incontinence, no extremity weakness, no loss of consciousness and no laceration. The risk factors include being elderly. Past Medical History:  
Diagnosis Date  Anemia   
 no recent episodes  Anxiety   
 managed with medication  Arthritis   
 generalized  Bundle branch block, left   
 managed with medication  CAD (coronary artery disease) 2004  
 x 1 stent 2004-- ICD PLACED 3/2017-- FOLLOWED BY DR. Gabriel Bee  Cardiomyopathy ([facility] [address] EF 32% 2017 per cardiologist note 11-7-18  Chicken pox  Chronic systolic CHF (congestive heart failure) ([facility] [address] 3/13/2017 ICD placed 3/2017 biotronik-- followed by dr Anai Heller  Claustrophobia  Depression   
 managed with medication  Difficulty swallowing  Diverticulitis   
 resolved  Elevated cholesterol   
 managed with medication  H/O: whooping cough  Hiatal hernia   
 with bleeding, surgically repaired  History of panic attacks  [place] (hard of hearing) bilat  Hypertension   
 managed with medication  Measles  Mumps  Murmur, cardiac   
 echo dated 1-5-17  NSAID sensitivity  Post-menopausal   
 PUD (peptic ulcer disease) bleeding ulcers, cannot take NSAIDS  Pyloric ulcer associated with Helicobacter pylori 8411 Past Surgical History:  
Procedure Laterality Date  HX BREAST BIOPSY Left Benign cyst  
 HX CATARACT REMOVAL Bilateral 2011  HX GYN Right 2018  
 right ovarian mass resection  HX HEART CATHETERIZATION  2016 STENT X 2  
 HX HERNIA REPAIR  2011 Hiatal   
 HX HIP REPLACEMENT Right  HX KNEE ARTHROSCOPY Right 2007  
 right  HX OTHER SURGICAL  03/2017 ICD moniter inserted  HX PACEMAKER    
 NEUROLOGICAL PROCEDURE UNLISTED    
 back surgery [facility]  2010/2004 PCI to LAD x1 Family History:  
Problem Relation Age of Onset  Hypertension Father  Breast Cancer Mother 55  Cancer Mother  Colon Cancer Neg Hx   
 Ovarian Cancer Neg Hx Social History Socioeconomic History  Marital status: SINGLE Spouse name: Not on file  Number of children: Not on file  Years of education: Not on file  Highest education level: Not on file Occupational History  Not on file Social Needs  Financial resource strain: Not on file  Food insecurity:  
  Worry: Not on file Inability: Not on file  Transportation needs:  
  Medical: Not on file Non-medical: Not on file Tobacco Use  Smoking status: Never Smoker  Smokeless tobacco: Never Used Substance and Sexual Activity  Alcohol use: Not Currently  Drug use: No  
 Sexual activity: Not Currently Lifestyle  Physical activity:  
  Days per week: Not on file Minutes per session: Not on file  Stress: Not on file Relationships  Social connections:  
  Talks on phone: Not on file Gets together: Not on file Attends [employer]ical service: Not on file Active member of club or organization: Not on file Attends meetings of clubs or organizations: Not on file Relationship status: Not on file  Intimate partner violence:  
  Fear of current or ex partner: Not on file Emotionally abused: Not on file Physically abused: Not on file Forced sexual activity: Not on file Other Topics Concern  Not on file Social History Narrative  Not on file ALLERGIES: Latex; Lactose; Adhesive; Atorvastatin; Augmentin [amoxicillin-pot clavulanate];  Codeine; Ezetimibe; Fluoxetine; Percocet [oxycodone-acetaminophen]; Potassium; Pravastatin; Prednisone; Simvastatin; and Wellbutrin [bupropion] Review of Systems Respiratory: Negative for shortness of breath. Cardiovascular: Negative for chest pain. Gastrointestinal: Negative for bowel incontinence. Musculoskeletal: Positive for neck pain. Negative for extremity weakness. Neurological: Negative for loss of consciousness, weakness and numbness. Psychiatric/Behavioral: Negative. All other systems reviewed and are negative. Vitals:  
 04/23/19 1157 BP: 152/80 Pulse: 68 Resp: 16 Temp: 98.2 °F (36.8 °C) SpO2: 96% Weight: 70.8 kg (156 lb) Height: 5' 7\" (1.702 m) Physical Exam  
Constitutional: She appears well-developed and well-nourished. No distress. HENT:  
Head: Atraumatic. Head is without raccoon's eyes, without Amin's sign, without abrasion and without contusion. Right Ear: External ear normal.  
Left Ear: External ear normal.  
Nose: Nose normal.  
Mouth/Throat: Oropharynx is clear and moist.  
[place] Eyes: No scleral icterus. Neck: Neck supple. Moves neck freely after cleared and out of collar Cardiovascular: Normal rate. Pulmonary/Chest: Effort normal. No respiratory distress. Abdominal: Soft. There is no tenderness. A hernia is present. Musculoskeletal: Normal range of motion. She exhibits no edema or deformity. Neurological: She is alert. No sensory deficit. She exhibits normal muscle tone. Skin: Skin is warm and dry. No laceration noted. She is not diaphoretic. Psychiatric: Thought content normal.  
Nursing note and vitals reviewed. MDM Number of Diagnoses or Management Options Fall, initial encounter:  
Strain of neck muscle, initial encounter:  
Diagnosis management comments: Here for check after sliding OOB. Exam benign and images are with no acute issue. Re-exam after collar is benign Amount and/or Complexity of Data Reviewed Tests in the radiology section of CPT®: reviewed Obtain history from someone other than the patient: yes Independent visualization of images, tracings, or specimens: yes Risk of Complications, Morbidity, and/or Mortality Presenting problems: moderate Diagnostic procedures: low Management options: moderate Patient Progress Patient progress: stable Procedures CT SPINE CERV WO CONT Final Result Impression: 1. No evidence of acute cervical spine fracture. 2. Cervical spondylosis. XR SPINE CERV 4 OR 5 V Final Result IMPRESSION: Poor visualization of the lower cervical spine due to overlapping  
soft tissues. In the setting of moderately advanced cervical spondylosis, CT  
scanning would be recommended to exclude an underlying occult fracture.

## (undated) DEVICE — DRAPE,CHEST,FENES,15X10,STERIL: Brand: MEDLINE

## (undated) DEVICE — TRAY PREP DRY W/ PREM GLV 2 APPL 6 SPNG 2 UNDPD 1 OVERWRAP

## (undated) DEVICE — SOLUTION IV 1000ML 0.9% SOD CHL

## (undated) DEVICE — LEGGINGS, PAIR, 31X48, STERILE: Brand: MEDLINE

## (undated) DEVICE — AMD ANTIMICROBIAL NON-ADHERENT PAD,0.2% POLYHEXAMETHYLENE BIGUANIDE HCI (PHMB): Brand: TELFA

## (undated) DEVICE — SUTURE ABSORBABLE BRAIDED 0 CT-1 8X27 IN UD VICRYL JJ41G

## (undated) DEVICE — KENDALL SCD EXPRESS SLEEVES, KNEE LENGTH, MEDIUM: Brand: KENDALL SCD

## (undated) DEVICE — SUTURE VCRL SZ 2-0 L36IN ABSRB UD L36MM CT-1 1/2 CIR J945H

## (undated) DEVICE — TROCAR: Brand: KII FIOS FIRST ENTRY

## (undated) DEVICE — REM POLYHESIVE ADULT PATIENT RETURN ELECTRODE: Brand: VALLEYLAB

## (undated) DEVICE — SURGICAL PROCEDURE PACK BASIC ST FRANCIS

## (undated) DEVICE — BUTTON SWITCH PENCIL BLADE ELECTRODE, HOLSTER: Brand: EDGE

## (undated) DEVICE — SHEET, T, LAPAROTOMY, STERILE: Brand: MEDLINE

## (undated) DEVICE — BAG SPEC REM 224ML W4XL6IN DIA10MM 1 HND GYN DISP ENDOPCH

## (undated) DEVICE — CONTAINER SPEC HISTOLOGY 900ML POLYPR

## (undated) DEVICE — DERMABOND SKIN ADH 0.7ML -- DERMABOND ADVANCED 12/BX

## (undated) DEVICE — (D)PREP SKN CHLRAPRP APPL 26ML -- CONVERT TO ITEM 371833

## (undated) DEVICE — PREP CHLORAPREP 10.5 ML ORG --

## (undated) DEVICE — DRAPE,UNDERBUTTOCKS,PCH,STERILE: Brand: MEDLINE

## (undated) DEVICE — SUTURE SZ 0 27IN 5/8 CIR UR-6  TAPER PT VIOLET ABSRB VICRYL J603H

## (undated) DEVICE — SLIM BODY SKIN STAPLER: Brand: APPOSE ULC

## (undated) DEVICE — 2000CC GUARDIAN II: Brand: GUARDIAN

## (undated) DEVICE — SUTURE PDS II SZ 1 L96IN ABSRB VLT TP-1 L65MM 1/2 CIR Z880G

## (undated) DEVICE — SUTURE VCRL SZ 0 L54IN ABSRB UD LIGAPAK REEL NDL J287G

## (undated) DEVICE — AMD ANTIMICROBIAL GAUZE SPONGES,12 PLY USP TYPE VII, 0.2% POLYHEXAMETHYLENE BIGUANIDE HCI (PHMB): Brand: CURITY

## (undated) DEVICE — TRAY CATH 16F DRN BG LTX -- CONVERT TO ITEM 363158

## (undated) DEVICE — DRAPE TWL SURG 16X26IN BLU ORB04] ALLCARE INC]

## (undated) DEVICE — PERI-PAD,MODERATE: Brand: CURITY

## (undated) DEVICE — SPONGE LAP 18X18IN STRL -- 5/PK

## (undated) DEVICE — BLUNT TIP LAPAROSCOPIC SEALER/DIVIDER: Brand: LIGASURE

## (undated) DEVICE — BLADE ELECTRODE: Brand: EDGE

## (undated) DEVICE — LAP CHOLE: Brand: MEDLINE INDUSTRIES, INC.